# Patient Record
Sex: FEMALE | Race: WHITE | NOT HISPANIC OR LATINO | Employment: PART TIME | URBAN - METROPOLITAN AREA
[De-identification: names, ages, dates, MRNs, and addresses within clinical notes are randomized per-mention and may not be internally consistent; named-entity substitution may affect disease eponyms.]

---

## 2019-12-06 ENCOUNTER — TELEPHONE (OUTPATIENT)
Dept: HEMATOLOGY ONCOLOGY | Facility: CLINIC | Age: 49
End: 2019-12-06

## 2019-12-06 NOTE — TELEPHONE ENCOUNTER
New Patient Encounter    New Patient Intake Form   Patient Details:  Lincoln Martinez  1970  [id number]    Background Information:  [address] starts by opening a telephone encounter and gathering the following information   Who is calling to schedule? If not self, relationship to patient? self   Referring Provider Lidia Ritchie   What is the diagnosis? Complex atypical endometrial hyperplasia   When was the diagnosis? 11/2019   Is patient aware of diagnosis? Yes   Reason for visit? NP DX   Have you had any testing done? If so: when, where? Yes   Are records in EPIC? Yes, scanned to [facility]   Was the patient told to bring a disk? no   Scheduling Information:   Preferred [place]:  [facility]     Requesting Specific Provider? Lindsay Og   Are there any dates/time the patient cannot be seen? Miscellaneous: Will have [address] at [facility] on 12/9/19  Pt has [employer], cost sharing plan  She will pay at office and then be reimbursed  Should be listed as selfpay   After completing the above information, please route to Financial Counselor and the appropriate Nurse Navigator for review

## 2019-12-09 ENCOUNTER — TRANSCRIBE ORDERS (OUTPATIENT)
Dept: ADMINISTRATIVE | Facility: HOSPITAL | Age: 49
End: 2019-12-09

## 2019-12-10 ENCOUNTER — TRANSCRIBE ORDERS (OUTPATIENT)
Dept: ADMINISTRATIVE | Facility: HOSPITAL | Age: 49
End: 2019-12-10

## 2019-12-10 DIAGNOSIS — Z12.31 SCREENING MAMMOGRAM FOR HIGH-RISK PATIENT: Primary | ICD-10-CM

## 2019-12-20 ENCOUNTER — TRANSCRIBE ORDERS (OUTPATIENT)
Dept: RADIOLOGY | Facility: HOSPITAL | Age: 49
End: 2019-12-20

## 2019-12-20 ENCOUNTER — HOSPITAL ENCOUNTER (OUTPATIENT)
Dept: RADIOLOGY | Facility: HOSPITAL | Age: 49
Discharge: HOME | End: 2019-12-20
Attending: OBSTETRICS & GYNECOLOGY
Payer: COMMERCIAL

## 2019-12-20 ENCOUNTER — OFFICE VISIT (OUTPATIENT)
Dept: LAB | Facility: HOSPITAL | Age: 49
End: 2019-12-20
Attending: OBSTETRICS & GYNECOLOGY
Payer: COMMERCIAL

## 2019-12-20 ENCOUNTER — CONSULT (OUTPATIENT)
Dept: GYNECOLOGIC ONCOLOGY | Facility: CLINIC | Age: 49
End: 2019-12-20

## 2019-12-20 VITALS
HEIGHT: 63 IN | DIASTOLIC BLOOD PRESSURE: 80 MMHG | BODY MASS INDEX: 18.8 KG/M2 | TEMPERATURE: 99.5 F | HEART RATE: 74 BPM | WEIGHT: 106.1 LBS | SYSTOLIC BLOOD PRESSURE: 122 MMHG

## 2019-12-20 DIAGNOSIS — N85.02 COMPLEX ATYPICAL ENDOMETRIAL HYPERPLASIA: ICD-10-CM

## 2019-12-20 DIAGNOSIS — N85.02 COMPLEX ATYPICAL ENDOMETRIAL HYPERPLASIA: Primary | ICD-10-CM

## 2019-12-20 LAB
ALBUMIN SERPL BCP-MCNC: 3.7 G/DL (ref 3.5–5)
ALP SERPL-CCNC: 34 U/L (ref 46–116)
ALT SERPL W P-5'-P-CCNC: 21 U/L (ref 12–78)
ANION GAP SERPL CALCULATED.3IONS-SCNC: 4 MMOL/L (ref 4–13)
APTT PPP: 31 SECONDS (ref 23–37)
AST SERPL W P-5'-P-CCNC: 11 U/L (ref 5–45)
BASOPHILS # BLD AUTO: 0.04 THOUSANDS/ΜL (ref 0–0.1)
BASOPHILS NFR BLD AUTO: 0 % (ref 0–1)
BILIRUB SERPL-MCNC: 0.95 MG/DL (ref 0.2–1)
BUN SERPL-MCNC: 12 MG/DL (ref 5–25)
CALCIUM SERPL-MCNC: 9.2 MG/DL (ref 8.3–10.1)
CHLORIDE SERPL-SCNC: 106 MMOL/L (ref 100–108)
CO2 SERPL-SCNC: 29 MMOL/L (ref 21–32)
CREAT SERPL-MCNC: 0.72 MG/DL (ref 0.6–1.3)
EOSINOPHIL # BLD AUTO: 0.06 THOUSAND/ΜL (ref 0–0.61)
EOSINOPHIL NFR BLD AUTO: 1 % (ref 0–6)
ERYTHROCYTE [DISTWIDTH] IN BLOOD BY AUTOMATED COUNT: 12.1 % (ref 11.6–15.1)
EST. AVERAGE GLUCOSE BLD GHB EST-MCNC: 94 MG/DL
GFR SERPL CREATININE-BSD FRML MDRD: 98 ML/MIN/1.73SQ M
GLUCOSE P FAST SERPL-MCNC: 181 MG/DL (ref 65–99)
HBA1C MFR BLD: 4.9 % (ref 4.2–6.3)
HCT VFR BLD AUTO: 40.3 % (ref 34.8–46.1)
HGB BLD-MCNC: 12.7 G/DL (ref 11.5–15.4)
IMM GRANULOCYTES # BLD AUTO: 0.03 THOUSAND/UL (ref 0–0.2)
IMM GRANULOCYTES NFR BLD AUTO: 0 % (ref 0–2)
INR PPP: 1.12 (ref 0.84–1.19)
LYMPHOCYTES # BLD AUTO: 1.11 THOUSANDS/ΜL (ref 0.6–4.47)
LYMPHOCYTES NFR BLD AUTO: 12 % (ref 14–44)
MCH RBC QN AUTO: 28.8 PG (ref 26.8–34.3)
MCHC RBC AUTO-ENTMCNC: 31.5 G/DL (ref 31.4–37.4)
MCV RBC AUTO: 91 FL (ref 82–98)
MONOCYTES # BLD AUTO: 0.5 THOUSAND/ΜL (ref 0.17–1.22)
MONOCYTES NFR BLD AUTO: 6 % (ref 4–12)
NEUTROPHILS # BLD AUTO: 7.32 THOUSANDS/ΜL (ref 1.85–7.62)
NEUTS SEG NFR BLD AUTO: 81 % (ref 43–75)
NRBC BLD AUTO-RTO: 0 /100 WBCS
PLATELET # BLD AUTO: 267 THOUSANDS/UL (ref 149–390)
PMV BLD AUTO: 9.6 FL (ref 8.9–12.7)
POTASSIUM SERPL-SCNC: 4.1 MMOL/L (ref 3.5–5.3)
PROT SERPL-MCNC: 7.2 G/DL (ref 6.4–8.2)
PROTHROMBIN TIME: 14 SECONDS (ref 11.6–14.5)
RBC # BLD AUTO: 4.41 MILLION/UL (ref 3.81–5.12)
SODIUM SERPL-SCNC: 139 MMOL/L (ref 136–145)
WBC # BLD AUTO: 9.06 THOUSAND/UL (ref 4.31–10.16)

## 2019-12-20 PROCEDURE — 99245 OFF/OP CONSLTJ NEW/EST HI 55: CPT | Performed by: OBSTETRICS & GYNECOLOGY

## 2019-12-20 PROCEDURE — 83036 HEMOGLOBIN GLYCOSYLATED A1C: CPT

## 2019-12-20 PROCEDURE — 71046 X-RAY EXAM CHEST 2 VIEWS: CPT

## 2019-12-20 PROCEDURE — 86850 RBC ANTIBODY SCREEN: CPT

## 2019-12-20 PROCEDURE — 85610 PROTHROMBIN TIME: CPT

## 2019-12-20 PROCEDURE — 86900 BLOOD TYPING SEROLOGIC ABO: CPT

## 2019-12-20 PROCEDURE — 85730 THROMBOPLASTIN TIME PARTIAL: CPT

## 2019-12-20 PROCEDURE — 36415 COLL VENOUS BLD VENIPUNCTURE: CPT

## 2019-12-20 PROCEDURE — 93005 ELECTROCARDIOGRAM TRACING: CPT

## 2019-12-20 PROCEDURE — 85025 COMPLETE CBC W/AUTO DIFF WBC: CPT

## 2019-12-20 PROCEDURE — 80053 COMPREHEN METABOLIC PANEL: CPT

## 2019-12-20 PROCEDURE — 86901 BLOOD TYPING SEROLOGIC RH(D): CPT

## 2019-12-20 RX ORDER — CLINDAMYCIN PHOSPHATE 900 MG/50ML
900 INJECTION INTRAVENOUS ONCE
Status: CANCELLED | OUTPATIENT
Start: 2019-12-27 | End: 2019-12-20

## 2019-12-20 RX ORDER — GENTAMICIN SULFATE 80 MG/50ML
1.5 INJECTION, SOLUTION INTRAVENOUS ONCE
Status: CANCELLED | OUTPATIENT
Start: 2019-12-27 | End: 2019-12-20

## 2019-12-20 RX ORDER — ACETAMINOPHEN 500 MG
500 TABLET ORAL EVERY 6 HOURS PRN
COMMUNITY

## 2019-12-20 RX ORDER — ACETAMINOPHEN 325 MG/1
975 TABLET ORAL ONCE
Status: CANCELLED | OUTPATIENT
Start: 2019-12-27 | End: 2019-12-20

## 2019-12-20 RX ORDER — HEPARIN SODIUM 5000 [USP'U]/ML
5000 INJECTION, SOLUTION INTRAVENOUS; SUBCUTANEOUS
Status: CANCELLED | OUTPATIENT
Start: 2019-12-27 | End: 2019-12-28

## 2019-12-20 NOTE — ASSESSMENT & PLAN NOTE
50-year-old with biopsy-proven complex atypical hyperplasia, 15 week size fibroid uterus, BMI 19  Her performance status is 0   1  Continue Aygestin until surgery  2  I discussed the risks and benefits of possible robotic assisted total laparoscopic hysterectomy, bilateral salpingo oophorectomy, possible conversion to exploratory laparotomy and all other indicated procedures including possible lymph node dissection and staging  She understands the risks and benefits of the operation agrees to proceed as outlined  Consent was obtained by me in the office  3  She understands that complex atypical hyperplasia is associated with invasive endometrial cancer and 40% of cases  She understands that typically the endometrial cancer is low-grade and early stage but that there is an approximate 5% risk that she would require additional treatment such as radiation therapy postoperatively  Thank you for the courtesy of this consultation  All questions were answered by the end of the visit

## 2019-12-20 NOTE — LETTER
December 20, 2019     Dana Hair, [address] [facility] [address]  [address]  [facility]  [facility] [address]    Patient: Goldie Stevens   YOB: 1970   Date of Visit: 12/20/2019       Dear Tona Chávez:    Thank you for referring Goldie Stevens to me for evaluation  Below are the relevant portions of my H&P  If you have questions, please do not hesitate to call me  I look forward to following Blaise Horne along with you  Sincerely,        Kelli Jackson MD        CC: No Recipients  Kelli Jackson MD  12/20/2019 12:16 PM  Signed  Assessment/Plan:    Problem List Items Addressed This Visit        Genitourinary    Complex atypical endometrial hyperplasia - Primary     50-year-old with biopsy-proven complex atypical hyperplasia, 15 week size fibroid uterus, BMI 19  Her performance status is 0   1  Continue Aygestin until surgery  2  I discussed the risks and benefits of possible robotic assisted total laparoscopic hysterectomy, bilateral salpingo oophorectomy, possible conversion to exploratory laparotomy and all other indicated procedures including possible lymph node dissection and staging  She understands the risks and benefits of the operation agrees to proceed as outlined  Consent was obtained by me in the office  3  She understands that complex atypical hyperplasia is associated with invasive endometrial cancer and 40% of cases  She understands that typically the endometrial cancer is low-grade and early stage but that there is an approximate 5% risk that she would require additional treatment such as radiation therapy postoperatively  Thank you for the courtesy of this consultation  All questions were answered by the end of the visit             Relevant Orders    Case request operating room: HYSTERECTOMY LAPAROSCOPIC TOTAL ([address] [place] [address]) W/ ROBOTICS (Completed)    Comprehensive metabolic panel    CBC and differential    APTT    Protime-INR    HEMOGLOBIN A1C W/ EAG ESTIMATION    Type and screen    EKG 12 lead XR chest pa & lateral              CHIEF COMPLAINT:  Biopsy-proven complex atypical hyperplasia of the endometrium, abnormal uterine bleeding          Patient ID: Yonatan Gates is a 47 y o  female  24-year-old with abnormal uterine bleeding starting in September of 2019 who had a pelvic ultrasound revealed multiple fibroids  She had an endometrial biopsy performed on 11/28/2019 that revealed complex atypical hyperplasia of the endometrium  She was referred as a consultation by LALITO Domingo to discuss treatment options  Her bleeding has been managed with Aygestin  She takes 5 mg daily and no longer has heavy bleeding  Her most recent Pap smear with HPV was negative in November of 2019  Endometrial cells were present on the Pap smear  Review of Systems   Constitutional: Negative for activity change and unexpected weight change  HENT: Negative  Eyes: Negative  Respiratory: Negative  Cardiovascular: Negative  Gastrointestinal: Negative for abdominal distention and abdominal pain  Endocrine: Negative  Genitourinary: Positive for menstrual problem  Negative for pelvic pain  Musculoskeletal: Negative  Skin: Negative  Allergic/Immunologic: Negative  Neurological: Negative  Hematological: Negative  Psychiatric/Behavioral: Negative  Current Outpatient Medications   Medication Sig Dispense Refill    acetaminophen (TYLENOL) 500 mg tablet Take 500 mg by mouth every 6 (six) hours as needed for mild pain      Multiple Vitamins-Calcium (ONE-A-DAY WOMENS FORMULA PO) Take by mouth      norethindrone (AYGESTIN) 5 mg tablet Take 5 mg by mouth daily      Probiotic Product (PROBIOTIC + TURMERIC EXTRACT) 400 MG CAPS Take 580 mg by mouth       No current facility-administered medications for this visit          Allergies   Allergen Reactions    Penicillin V Hives    Ibuprofen Rash       Past Medical History:   Diagnosis Date    [place] teeth extracted 08/1998       History reviewed  No pertinent surgical history  OB History        0    Para   0    Term   0       0    AB   0    Living   0       SAB   0    TAB   0    Ectopic   0    Multiple   0    Live Births   0                 Family History   Problem Relation Age of Onset    Colon polyps Father     Colon cancer Maternal Aunt     Pancreatic cancer Paternal Uncle     Cancer Paternal Aunt        The following portions of the patient's history were reviewed and updated as appropriate: allergies, current medications, past family history, past medical history, past social history, past surgical history and problem list       Objective:    Blood pressure 122/80, pulse 74, temperature 99 5 °F (37 5 °C), temperature source Oral, height 5' 3" (1 6 m), weight 48 1 kg (106 lb 1 6 oz), last menstrual period 2019  Body mass index is 18 79 kg/m²  Physical Exam   Constitutional: She is oriented to person, place, and time  She appears well-developed and well-nourished  No distress  HENT:   Head: Normocephalic and atraumatic  Neck: Normal range of motion  Neck supple  No thyromegaly present  Cardiovascular: Normal rate, regular rhythm and normal heart sounds  Pulmonary/Chest: Effort normal and breath sounds normal    Abdominal: Soft  She exhibits no distension and no mass  There is no tenderness  There is no rebound and no guarding  Genitourinary:   Genitourinary Comments: The external female genitalia is normal  The bartholin's, uretheral and skenes glands are normal  The urethral meatus is normal (midline with no lesions)  Anus without fissure or lesion  Speculum exam reveals vagina without lesion or discharge  Cervix is normal appearing without visible lesions  No bleeding  No significant cystocele or rectocele noted  Bimanual exam notes enlarged uterus filling the pelvis from sidewall to sidewall  It measures approximately 15 weeks in size  No palpable adnexal masses    Bladder is without fullness, mass or tenderness  Musculoskeletal: She exhibits no edema  Lymphadenopathy:     She has no cervical adenopathy  Neurological: She is alert and oriented to person, place, and time  Skin: Skin is warm and dry  She is not diaphoretic  Psychiatric: She has a normal mood and affect   Her behavior is normal  Judgment and thought content normal

## 2019-12-20 NOTE — H&P
Assessment/Plan:    Problem List Items Addressed This Visit        Genitourinary    Complex atypical endometrial hyperplasia - Primary     48-year-old with biopsy-proven complex atypical hyperplasia, 15 week size fibroid uterus, BMI 19  Her performance status is 0   1  Continue Aygestin until surgery  2  I discussed the risks and benefits of possible robotic assisted total laparoscopic hysterectomy, bilateral salpingo oophorectomy, possible conversion to exploratory laparotomy and all other indicated procedures including possible lymph node dissection and staging  She understands the risks and benefits of the operation agrees to proceed as outlined  Consent was obtained by me in the office  3  She understands that complex atypical hyperplasia is associated with invasive endometrial cancer and 40% of cases  She understands that typically the endometrial cancer is low-grade and early stage but that there is an approximate 5% risk that she would require additional treatment such as radiation therapy postoperatively  Thank you for the courtesy of this consultation  All questions were answered by the end of the visit  Relevant Orders    Case request operating room: HYSTERECTOMY LAPAROSCOPIC TOTAL ([address] [facility]) W/ ROBOTICS (Completed)    Comprehensive metabolic panel    CBC and differential    APTT    Protime-INR    HEMOGLOBIN A1C W/ EAG ESTIMATION    Type and screen    EKG 12 lead    XR chest pa & lateral              CHIEF COMPLAINT:  Biopsy-proven complex atypical hyperplasia of the endometrium, abnormal uterine bleeding          Patient ID: Brian Lake is a 47 y o  female  48-year-old with abnormal uterine bleeding starting in September of 2019 who had a pelvic ultrasound revealed multiple fibroids  She had an endometrial biopsy performed on 11/28/2019 that revealed complex atypical hyperplasia of the endometrium  She was referred as a consultation by LALITO Beaulieu to discuss treatment options    Her bleeding has been managed with Aygestin  She takes 5 mg daily and no longer has heavy bleeding  Her most recent Pap smear with HPV was negative in 2019  Endometrial cells were present on the Pap smear  Review of Systems   Constitutional: Negative for activity change and unexpected weight change  HENT: Negative  Eyes: Negative  Respiratory: Negative  Cardiovascular: Negative  Gastrointestinal: Negative for abdominal distention and abdominal pain  Endocrine: Negative  Genitourinary: Positive for menstrual problem  Negative for pelvic pain  Musculoskeletal: Negative  Skin: Negative  Allergic/Immunologic: Negative  Neurological: Negative  Hematological: Negative  Psychiatric/Behavioral: Negative  Current Outpatient Medications   Medication Sig Dispense Refill    acetaminophen (TYLENOL) 500 mg tablet Take 500 mg by mouth every 6 (six) hours as needed for mild pain      Multiple Vitamins-Calcium (ONE-A-DAY WOMENS FORMULA PO) Take by mouth      norethindrone (AYGESTIN) 5 mg tablet Take 5 mg by mouth daily      Probiotic Product (PROBIOTIC + TURMERIC EXTRACT) 400 MG CAPS Take 580 mg by mouth       No current facility-administered medications for this visit  Allergies   Allergen Reactions    Penicillin V Hives    Ibuprofen Rash       Past Medical History:   Diagnosis Date    [place] teeth extracted 1998       History reviewed  No pertinent surgical history      OB History        0    Para   0    Term   0       0    AB   0    Living   0       SAB   0    TAB   0    Ectopic   0    Multiple   0    Live Births   0                 Family History   Problem Relation Age of Onset    Colon polyps Father     Colon cancer Maternal Aunt     Pancreatic cancer Paternal Uncle     Cancer Paternal Aunt        The following portions of the patient's history were reviewed and updated as appropriate: allergies, current medications, past family history, past medical history, past social history, past surgical history and problem list       Objective:    Blood pressure 122/80, pulse 74, temperature 99 5 °F (37 5 °C), temperature source Oral, height 5' 3" (1 6 m), weight 48 1 kg (106 lb 1 6 oz), last menstrual period 11/13/2019  Body mass index is 18 79 kg/m²  Physical Exam   Constitutional: She is oriented to person, place, and time  She appears well-developed and well-nourished  No distress  HENT:   Head: Normocephalic and atraumatic  Neck: Normal range of motion  Neck supple  No thyromegaly present  Cardiovascular: Normal rate, regular rhythm and normal heart sounds  Pulmonary/Chest: Effort normal and breath sounds normal    Abdominal: Soft  She exhibits no distension and no mass  There is no tenderness  There is no rebound and no guarding  Genitourinary:   Genitourinary Comments: The external female genitalia is normal  The bartholin's, uretheral and skenes glands are normal  The urethral meatus is normal (midline with no lesions)  Anus without fissure or lesion  Speculum exam reveals vagina without lesion or discharge  Cervix is normal appearing without visible lesions  No bleeding  No significant cystocele or rectocele noted  Bimanual exam notes enlarged uterus filling the pelvis from sidewall to sidewall  It measures approximately 15 weeks in size  No palpable adnexal masses    Bladder is without fullness, mass or tenderness  Musculoskeletal: She exhibits no edema  Lymphadenopathy:     She has no cervical adenopathy  Neurological: She is alert and oriented to person, place, and time  Skin: Skin is warm and dry  She is not diaphoretic  Psychiatric: She has a normal mood and affect   Her behavior is normal  Judgment and thought content normal

## 2019-12-20 NOTE — H&P (VIEW-ONLY)
Assessment/Plan:    Problem List Items Addressed This Visit        Genitourinary    Complex atypical endometrial hyperplasia - Primary     50-year-old with biopsy-proven complex atypical hyperplasia, 15 week size fibroid uterus, BMI 19  Her performance status is 0   1  Continue Aygestin until surgery  2  I discussed the risks and benefits of possible robotic assisted total laparoscopic hysterectomy, bilateral salpingo oophorectomy, possible conversion to exploratory laparotomy and all other indicated procedures including possible lymph node dissection and staging  She understands the risks and benefits of the operation agrees to proceed as outlined  Consent was obtained by me in the office  3  She understands that complex atypical hyperplasia is associated with invasive endometrial cancer and 40% of cases  She understands that typically the endometrial cancer is low-grade and early stage but that there is an approximate 5% risk that she would require additional treatment such as radiation therapy postoperatively  Thank you for the courtesy of this consultation  All questions were answered by the end of the visit  Relevant Orders    Case request operating room: HYSTERECTOMY LAPAROSCOPIC TOTAL ([address]) W/ ROBOTICS (Completed)    Comprehensive metabolic panel    CBC and differential    APTT    Protime-INR    HEMOGLOBIN A1C W/ EAG ESTIMATION    Type and screen    EKG 12 lead    XR chest pa & lateral              CHIEF COMPLAINT:  Biopsy-proven complex atypical hyperplasia of the endometrium, abnormal uterine bleeding          Patient ID: Fátima Fowler is a 47 y o  female  50-year-old with abnormal uterine bleeding starting in September of 2019 who had a pelvic ultrasound revealed multiple fibroids  She had an endometrial biopsy performed on 11/28/2019 that revealed complex atypical hyperplasia of the endometrium  She was referred as a consultation by LALITO White to discuss treatment options    Her bleeding has been managed with Aygestin  She takes 5 mg daily and no longer has heavy bleeding  Her most recent Pap smear with HPV was negative in 2019  Endometrial cells were present on the Pap smear  Review of Systems   Constitutional: Negative for activity change and unexpected weight change  HENT: Negative  Eyes: Negative  Respiratory: Negative  Cardiovascular: Negative  Gastrointestinal: Negative for abdominal distention and abdominal pain  Endocrine: Negative  Genitourinary: Positive for menstrual problem  Negative for pelvic pain  Musculoskeletal: Negative  Skin: Negative  Allergic/Immunologic: Negative  Neurological: Negative  Hematological: Negative  Psychiatric/Behavioral: Negative  Current Outpatient Medications   Medication Sig Dispense Refill    acetaminophen (TYLENOL) 500 mg tablet Take 500 mg by mouth every 6 (six) hours as needed for mild pain      Multiple Vitamins-Calcium (ONE-A-DAY WOMENS FORMULA PO) Take by mouth      norethindrone (AYGESTIN) 5 mg tablet Take 5 mg by mouth daily      Probiotic Product (PROBIOTIC + TURMERIC EXTRACT) 400 MG CAPS Take 580 mg by mouth       No current facility-administered medications for this visit  Allergies   Allergen Reactions    Penicillin V Hives    Ibuprofen Rash       Past Medical History:   Diagnosis Date    [place] teeth extracted 1998       History reviewed  No pertinent surgical history      OB History        0    Para   0    Term   0       0    AB   0    Living   0       SAB   0    TAB   0    Ectopic   0    Multiple   0    Live Births   0                 Family History   Problem Relation Age of Onset    Colon polyps Father     Colon cancer Maternal Aunt     Pancreatic cancer Paternal Uncle     Cancer Paternal Aunt        The following portions of the patient's history were reviewed and updated as appropriate: allergies, current medications, past family history, past medical history, past social history, past surgical history and problem list       Objective:    Blood pressure 122/80, pulse 74, temperature 99 5 °F (37 5 °C), temperature source Oral, height 5' 3" (1 6 m), weight 48 1 kg (106 lb 1 6 oz), last menstrual period 11/13/2019  Body mass index is 18 79 kg/m²  Physical Exam   Constitutional: She is oriented to person, place, and time  She appears well-developed and well-nourished  No distress  HENT:   Head: Normocephalic and atraumatic  Neck: Normal range of motion  Neck supple  No thyromegaly present  Cardiovascular: Normal rate, regular rhythm and normal heart sounds  Pulmonary/Chest: Effort normal and breath sounds normal    Abdominal: Soft  She exhibits no distension and no mass  There is no tenderness  There is no rebound and no guarding  Genitourinary:   Genitourinary Comments: The external female genitalia is normal  The bartholin's, uretheral and skenes glands are normal  The urethral meatus is normal (midline with no lesions)  Anus without fissure or lesion  Speculum exam reveals vagina without lesion or discharge  Cervix is normal appearing without visible lesions  No bleeding  No significant cystocele or rectocele noted  Bimanual exam notes enlarged uterus filling the pelvis from sidewall to sidewall  It measures approximately 15 weeks in size  No palpable adnexal masses    Bladder is without fullness, mass or tenderness  Musculoskeletal: She exhibits no edema  Lymphadenopathy:     She has no cervical adenopathy  Neurological: She is alert and oriented to person, place, and time  Skin: Skin is warm and dry  She is not diaphoretic  Psychiatric: She has a normal mood and affect   Her behavior is normal  Judgment and thought content normal

## 2019-12-20 NOTE — LETTER
December 20, 2019     Spenser Daley   [facility] [address] [place] [address]    Patient: Bharat Kwong   YOB: 1970   Date of Visit: 12/20/2019       Dear Dr Daniel Sandoval: Thank you for referring Bharat Kwong to me for evaluation  Below are the relevant portions of my H&P  If you have questions, please do not hesitate to call me  I look forward to following Osmany Eth along with you  Sincerely,        Anila Johnson MD        CC: No Recipients  Anila Johnson MD  12/20/2019 12:16 PM  Signed  Assessment/Plan:    Problem List Items Addressed This Visit        Genitourinary    Complex atypical endometrial hyperplasia - Primary     54-year-old with biopsy-proven complex atypical hyperplasia, 15 week size fibroid uterus, BMI 19  Her performance status is 0   1  Continue Aygestin until surgery  2  I discussed the risks and benefits of possible robotic assisted total laparoscopic hysterectomy, bilateral salpingo oophorectomy, possible conversion to exploratory laparotomy and all other indicated procedures including possible lymph node dissection and staging  She understands the risks and benefits of the operation agrees to proceed as outlined  Consent was obtained by me in the office  3  She understands that complex atypical hyperplasia is associated with invasive endometrial cancer and 40% of cases  She understands that typically the endometrial cancer is low-grade and early stage but that there is an approximate 5% risk that she would require additional treatment such as radiation therapy postoperatively  Thank you for the courtesy of this consultation  All questions were answered by the end of the visit             Relevant Orders    Case request operating room: HYSTERECTOMY LAPAROSCOPIC TOTAL ([address]) W/ ROBOTICS (Completed)    Comprehensive metabolic panel    CBC and differential    APTT    Protime-INR    HEMOGLOBIN A1C W/ EAG ESTIMATION    Type and screen    EKG 12 lead    XR chest pa & lateral              CHIEF COMPLAINT:  Biopsy-proven complex atypical hyperplasia of the endometrium, abnormal uterine bleeding          Patient ID: Ray Adkins is a 47 y o  female  29-year-old with abnormal uterine bleeding starting in September of 2019 who had a pelvic ultrasound revealed multiple fibroids  She had an endometrial biopsy performed on 11/28/2019 that revealed complex atypical hyperplasia of the endometrium  She was referred as a consultation by LALITO Cedeño to discuss treatment options  Her bleeding has been managed with Aygestin  She takes 5 mg daily and no longer has heavy bleeding  Her most recent Pap smear with HPV was negative in November of 2019  Endometrial cells were present on the Pap smear  Review of Systems   Constitutional: Negative for activity change and unexpected weight change  HENT: Negative  Eyes: Negative  Respiratory: Negative  Cardiovascular: Negative  Gastrointestinal: Negative for abdominal distention and abdominal pain  Endocrine: Negative  Genitourinary: Positive for menstrual problem  Negative for pelvic pain  Musculoskeletal: Negative  Skin: Negative  Allergic/Immunologic: Negative  Neurological: Negative  Hematological: Negative  Psychiatric/Behavioral: Negative  Current Outpatient Medications   Medication Sig Dispense Refill    acetaminophen (TYLENOL) 500 mg tablet Take 500 mg by mouth every 6 (six) hours as needed for mild pain      Multiple Vitamins-Calcium (ONE-A-DAY WOMENS FORMULA PO) Take by mouth      norethindrone (AYGESTIN) 5 mg tablet Take 5 mg by mouth daily      Probiotic Product (PROBIOTIC + TURMERIC EXTRACT) 400 MG CAPS Take 580 mg by mouth       No current facility-administered medications for this visit  Allergies   Allergen Reactions    Penicillin V Hives    Ibuprofen Rash       Past Medical History:   Diagnosis Date    [place] teeth extracted 08/1998       History reviewed   No pertinent surgical history  OB History        0    Para   0    Term   0       0    AB   0    Living   0       SAB   0    TAB   0    Ectopic   0    Multiple   0    Live Births   0                 Family History   Problem Relation Age of Onset    Colon polyps Father     Colon cancer Maternal Aunt     Pancreatic cancer Paternal Uncle     Cancer Paternal Aunt        The following portions of the patient's history were reviewed and updated as appropriate: allergies, current medications, past family history, past medical history, past social history, past surgical history and problem list       Objective:    Blood pressure 122/80, pulse 74, temperature 99 5 °F (37 5 °C), temperature source Oral, height 5' 3" (1 6 m), weight 48 1 kg (106 lb 1 6 oz), last menstrual period 2019  Body mass index is 18 79 kg/m²  Physical Exam   Constitutional: She is oriented to person, place, and time  She appears well-developed and well-nourished  No distress  HENT:   Head: Normocephalic and atraumatic  Neck: Normal range of motion  Neck supple  No thyromegaly present  Cardiovascular: Normal rate, regular rhythm and normal heart sounds  Pulmonary/Chest: Effort normal and breath sounds normal    Abdominal: Soft  She exhibits no distension and no mass  There is no tenderness  There is no rebound and no guarding  Genitourinary:   Genitourinary Comments: The external female genitalia is normal  The bartholin's, uretheral and skenes glands are normal  The urethral meatus is normal (midline with no lesions)  Anus without fissure or lesion  Speculum exam reveals vagina without lesion or discharge  Cervix is normal appearing without visible lesions  No bleeding  No significant cystocele or rectocele noted  Bimanual exam notes enlarged uterus filling the pelvis from sidewall to sidewall  It measures approximately 15 weeks in size  No palpable adnexal masses    Bladder is without fullness, mass or tenderness  Musculoskeletal: She exhibits no edema  Lymphadenopathy:     She has no cervical adenopathy  Neurological: She is alert and oriented to person, place, and time  Skin: Skin is warm and dry  She is not diaphoretic  Psychiatric: She has a normal mood and affect   Her behavior is normal  Judgment and thought content normal

## 2019-12-21 LAB
ATRIAL RATE: 77 BPM
P AXIS: 81 DEGREES
PR INTERVAL: 138 MS
QRS AXIS: -42 DEGREES
QRSD INTERVAL: 70 MS
QT INTERVAL: 348 MS
QTC INTERVAL: 393 MS
T WAVE AXIS: 37 DEGREES
VENTRICULAR RATE: 77 BPM

## 2019-12-21 PROCEDURE — 93010 ELECTROCARDIOGRAM REPORT: CPT | Performed by: INTERNAL MEDICINE

## 2019-12-23 LAB
ABO GROUP BLD: NORMAL
BLD GP AB SCN SERPL QL: NEGATIVE
RH BLD: POSITIVE
SPECIMEN EXPIRATION DATE: NORMAL

## 2019-12-23 NOTE — PRE-PROCEDURE INSTRUCTIONS
Pre-Surgery Instructions:   Medication Instructions    acetaminophen (TYLENOL) 500 mg tablet Patient was instructed by Physician and understands   Multiple Vitamins-Calcium (ONE-A-DAY WOMENS FORMULA PO) Patient was instructed by Physician and understands   norethindrone (AYGESTIN) 5 mg tablet Patient was instructed by Physician and understands   Probiotic Product (PROBIOTIC + TURMERIC EXTRACT) 400 MG CAPS Patient was instructed by Physician and understands  [facility] preop instructions reviewed with pt  Pt will get dial antibacterial soap  ERAS information reviewed with pt

## 2019-12-26 ENCOUNTER — ANESTHESIA EVENT (OUTPATIENT)
Dept: PERIOP | Facility: HOSPITAL | Age: 49
DRG: 512 | End: 2019-12-26
Payer: COMMERCIAL

## 2019-12-27 ENCOUNTER — HOSPITAL ENCOUNTER (INPATIENT)
Facility: HOSPITAL | Age: 49
LOS: 4 days | Discharge: HOME | DRG: 512 | End: 2019-12-31
Attending: OBSTETRICS & GYNECOLOGY | Admitting: OBSTETRICS & GYNECOLOGY
Payer: COMMERCIAL

## 2019-12-27 ENCOUNTER — ANESTHESIA (OUTPATIENT)
Dept: PERIOP | Facility: HOSPITAL | Age: 49
DRG: 512 | End: 2019-12-27
Payer: COMMERCIAL

## 2019-12-27 DIAGNOSIS — N85.02 COMPLEX ATYPICAL ENDOMETRIAL HYPERPLASIA: ICD-10-CM

## 2019-12-27 PROBLEM — Z90.710 STATUS POST TOTAL ABDOMINAL HYSTERECTOMY AND BILATERAL SALPINGO-OOPHORECTOMY (TAH-BSO): Status: ACTIVE | Noted: 2019-12-27

## 2019-12-27 PROBLEM — Z90.722 STATUS POST TOTAL ABDOMINAL HYSTERECTOMY AND BILATERAL SALPINGO-OOPHORECTOMY (TAH-BSO): Status: ACTIVE | Noted: 2019-12-27

## 2019-12-27 PROBLEM — Z90.79 STATUS POST TOTAL ABDOMINAL HYSTERECTOMY AND BILATERAL SALPINGO-OOPHORECTOMY (TAH-BSO): Status: ACTIVE | Noted: 2019-12-27

## 2019-12-27 LAB
ABO GROUP BLD: NORMAL
BLD GP AB SCN SERPL QL: NEGATIVE
EXT PREGNANCY TEST URINE: NEGATIVE
EXT. CONTROL: NORMAL
RH BLD: POSITIVE
SPECIMEN EXPIRATION DATE: NORMAL

## 2019-12-27 PROCEDURE — 88331 PATH CONSLTJ SURG 1 BLK 1SPC: CPT | Performed by: PATHOLOGY

## 2019-12-27 PROCEDURE — 88341 IMHCHEM/IMCYTCHM EA ADD ANTB: CPT | Performed by: PATHOLOGY

## 2019-12-27 PROCEDURE — 88309 TISSUE EXAM BY PATHOLOGIST: CPT | Performed by: PATHOLOGY

## 2019-12-27 PROCEDURE — 88342 IMHCHEM/IMCYTCHM 1ST ANTB: CPT | Performed by: PATHOLOGY

## 2019-12-27 PROCEDURE — 86850 RBC ANTIBODY SCREEN: CPT | Performed by: OBSTETRICS & GYNECOLOGY

## 2019-12-27 PROCEDURE — 86901 BLOOD TYPING SEROLOGIC RH(D): CPT | Performed by: OBSTETRICS & GYNECOLOGY

## 2019-12-27 PROCEDURE — 0UT90ZZ RESECTION OF UTERUS, OPEN APPROACH: ICD-10-PCS | Performed by: OBSTETRICS & GYNECOLOGY

## 2019-12-27 PROCEDURE — 99024 POSTOP FOLLOW-UP VISIT: CPT | Performed by: OBSTETRICS & GYNECOLOGY

## 2019-12-27 PROCEDURE — 58150 TOTAL HYSTERECTOMY: CPT | Performed by: OBSTETRICS & GYNECOLOGY

## 2019-12-27 PROCEDURE — 0UT20ZZ RESECTION OF BILATERAL OVARIES, OPEN APPROACH: ICD-10-PCS | Performed by: OBSTETRICS & GYNECOLOGY

## 2019-12-27 PROCEDURE — 0UT70ZZ RESECTION OF BILATERAL FALLOPIAN TUBES, OPEN APPROACH: ICD-10-PCS | Performed by: OBSTETRICS & GYNECOLOGY

## 2019-12-27 PROCEDURE — 81025 URINE PREGNANCY TEST: CPT | Performed by: ANESTHESIOLOGY

## 2019-12-27 PROCEDURE — 86900 BLOOD TYPING SEROLOGIC ABO: CPT | Performed by: OBSTETRICS & GYNECOLOGY

## 2019-12-27 RX ORDER — ROPIVACAINE HYDROCHLORIDE 2 MG/ML
INJECTION, SOLUTION EPIDURAL; INFILTRATION; PERINEURAL AS NEEDED
Status: DISCONTINUED | OUTPATIENT
Start: 2019-12-27 | End: 2019-12-27

## 2019-12-27 RX ORDER — PROPOFOL 10 MG/ML
INJECTION, EMULSION INTRAVENOUS AS NEEDED
Status: DISCONTINUED | OUTPATIENT
Start: 2019-12-27 | End: 2019-12-27 | Stop reason: SURG

## 2019-12-27 RX ORDER — FENTANYL CITRATE 50 UG/ML
INJECTION, SOLUTION INTRAMUSCULAR; INTRAVENOUS AS NEEDED
Status: DISCONTINUED | OUTPATIENT
Start: 2019-12-27 | End: 2019-12-27 | Stop reason: SURG

## 2019-12-27 RX ORDER — GENTAMICIN SULFATE 80 MG/50ML
1.5 INJECTION, SOLUTION INTRAVENOUS ONCE
Status: COMPLETED | OUTPATIENT
Start: 2019-12-27 | End: 2019-12-27

## 2019-12-27 RX ORDER — ROPIVACAINE HYDROCHLORIDE 2 MG/ML
INJECTION, SOLUTION EPIDURAL; INFILTRATION; PERINEURAL AS NEEDED
Status: DISCONTINUED | OUTPATIENT
Start: 2019-12-27 | End: 2019-12-27 | Stop reason: SURG

## 2019-12-27 RX ORDER — OXYCODONE HYDROCHLORIDE 5 MG/1
5 TABLET ORAL EVERY 4 HOURS PRN
Status: DISCONTINUED | OUTPATIENT
Start: 2019-12-27 | End: 2019-12-31 | Stop reason: HOSPADM

## 2019-12-27 RX ORDER — MAGNESIUM HYDROXIDE 1200 MG/15ML
LIQUID ORAL AS NEEDED
Status: DISCONTINUED | OUTPATIENT
Start: 2019-12-27 | End: 2019-12-27 | Stop reason: HOSPADM

## 2019-12-27 RX ORDER — DEXAMETHASONE SODIUM PHOSPHATE 10 MG/ML
INJECTION, SOLUTION INTRAMUSCULAR; INTRAVENOUS AS NEEDED
Status: DISCONTINUED | OUTPATIENT
Start: 2019-12-27 | End: 2019-12-27 | Stop reason: SURG

## 2019-12-27 RX ORDER — SODIUM CHLORIDE 9 MG/ML
125 INJECTION, SOLUTION INTRAVENOUS CONTINUOUS
Status: DISCONTINUED | OUTPATIENT
Start: 2019-12-27 | End: 2019-12-27 | Stop reason: HOSPADM

## 2019-12-27 RX ORDER — NEOSTIGMINE METHYLSULFATE 1 MG/ML
INJECTION INTRAVENOUS AS NEEDED
Status: DISCONTINUED | OUTPATIENT
Start: 2019-12-27 | End: 2019-12-27 | Stop reason: SURG

## 2019-12-27 RX ORDER — GLYCOPYRROLATE 0.2 MG/ML
INJECTION INTRAMUSCULAR; INTRAVENOUS AS NEEDED
Status: DISCONTINUED | OUTPATIENT
Start: 2019-12-27 | End: 2019-12-27 | Stop reason: SURG

## 2019-12-27 RX ORDER — DOCUSATE SODIUM 100 MG/1
100 CAPSULE, LIQUID FILLED ORAL 2 TIMES DAILY
Status: DISCONTINUED | OUTPATIENT
Start: 2019-12-27 | End: 2019-12-31 | Stop reason: HOSPADM

## 2019-12-27 RX ORDER — SIMETHICONE 80 MG
80 TABLET,CHEWABLE ORAL EVERY 6 HOURS PRN
Status: DISCONTINUED | OUTPATIENT
Start: 2019-12-27 | End: 2019-12-31 | Stop reason: HOSPADM

## 2019-12-27 RX ORDER — OXYCODONE HYDROCHLORIDE 10 MG/1
10 TABLET ORAL EVERY 4 HOURS PRN
Status: DISCONTINUED | OUTPATIENT
Start: 2019-12-27 | End: 2019-12-31 | Stop reason: HOSPADM

## 2019-12-27 RX ORDER — HYDROMORPHONE HCL 110MG/55ML
PATIENT CONTROLLED ANALGESIA SYRINGE INTRAVENOUS AS NEEDED
Status: DISCONTINUED | OUTPATIENT
Start: 2019-12-27 | End: 2019-12-27 | Stop reason: SURG

## 2019-12-27 RX ORDER — SODIUM CHLORIDE, SODIUM LACTATE, POTASSIUM CHLORIDE, CALCIUM CHLORIDE 600; 310; 30; 20 MG/100ML; MG/100ML; MG/100ML; MG/100ML
100 INJECTION, SOLUTION INTRAVENOUS CONTINUOUS
Status: DISCONTINUED | OUTPATIENT
Start: 2019-12-27 | End: 2019-12-31 | Stop reason: HOSPADM

## 2019-12-27 RX ORDER — VECURONIUM BROMIDE 1 MG/ML
INJECTION, POWDER, LYOPHILIZED, FOR SOLUTION INTRAVENOUS AS NEEDED
Status: DISCONTINUED | OUTPATIENT
Start: 2019-12-27 | End: 2019-12-27 | Stop reason: SURG

## 2019-12-27 RX ORDER — ACETAMINOPHEN 325 MG/1
975 TABLET ORAL ONCE
Status: COMPLETED | OUTPATIENT
Start: 2019-12-27 | End: 2019-12-27

## 2019-12-27 RX ORDER — ONDANSETRON 2 MG/ML
4 INJECTION INTRAMUSCULAR; INTRAVENOUS ONCE AS NEEDED
Status: DISCONTINUED | OUTPATIENT
Start: 2019-12-27 | End: 2019-12-27 | Stop reason: HOSPADM

## 2019-12-27 RX ORDER — ONDANSETRON 2 MG/ML
INJECTION INTRAMUSCULAR; INTRAVENOUS AS NEEDED
Status: DISCONTINUED | OUTPATIENT
Start: 2019-12-27 | End: 2019-12-27 | Stop reason: SURG

## 2019-12-27 RX ORDER — SCOLOPAMINE TRANSDERMAL SYSTEM 1 MG/1
1 PATCH, EXTENDED RELEASE TRANSDERMAL ONCE AS NEEDED
Status: DISCONTINUED | OUTPATIENT
Start: 2019-12-27 | End: 2019-12-27

## 2019-12-27 RX ORDER — HYDROMORPHONE HCL/PF 1 MG/ML
0.5 SYRINGE (ML) INJECTION
Status: DISCONTINUED | OUTPATIENT
Start: 2019-12-27 | End: 2019-12-27 | Stop reason: HOSPADM

## 2019-12-27 RX ORDER — HEPARIN SODIUM 5000 [USP'U]/ML
5000 INJECTION, SOLUTION INTRAVENOUS; SUBCUTANEOUS
Status: COMPLETED | OUTPATIENT
Start: 2019-12-27 | End: 2019-12-27

## 2019-12-27 RX ORDER — OXYCODONE HYDROCHLORIDE 5 MG/1
TABLET ORAL
Qty: 10 TABLET | Refills: 0 | Status: SHIPPED | OUTPATIENT
Start: 2019-12-27 | End: 2021-09-07 | Stop reason: ALTCHOICE

## 2019-12-27 RX ORDER — CLINDAMYCIN PHOSPHATE 900 MG/50ML
900 INJECTION INTRAVENOUS ONCE
Status: COMPLETED | OUTPATIENT
Start: 2019-12-27 | End: 2019-12-27

## 2019-12-27 RX ORDER — ACETAMINOPHEN 325 MG/1
650 TABLET ORAL EVERY 6 HOURS PRN
Status: DISCONTINUED | OUTPATIENT
Start: 2019-12-27 | End: 2019-12-31 | Stop reason: HOSPADM

## 2019-12-27 RX ORDER — ONDANSETRON 2 MG/ML
4 INJECTION INTRAMUSCULAR; INTRAVENOUS EVERY 6 HOURS PRN
Status: DISCONTINUED | OUTPATIENT
Start: 2019-12-27 | End: 2019-12-31 | Stop reason: HOSPADM

## 2019-12-27 RX ORDER — LIDOCAINE HYDROCHLORIDE 10 MG/ML
INJECTION, SOLUTION EPIDURAL; INFILTRATION; INTRACAUDAL; PERINEURAL AS NEEDED
Status: DISCONTINUED | OUTPATIENT
Start: 2019-12-27 | End: 2019-12-27 | Stop reason: SURG

## 2019-12-27 RX ORDER — KETOROLAC TROMETHAMINE 30 MG/ML
INJECTION, SOLUTION INTRAMUSCULAR; INTRAVENOUS AS NEEDED
Status: DISCONTINUED | OUTPATIENT
Start: 2019-12-27 | End: 2019-12-27 | Stop reason: SURG

## 2019-12-27 RX ORDER — FENTANYL CITRATE/PF 50 MCG/ML
50 SYRINGE (ML) INJECTION
Status: DISCONTINUED | OUTPATIENT
Start: 2019-12-27 | End: 2019-12-27 | Stop reason: HOSPADM

## 2019-12-27 RX ORDER — MIDAZOLAM HYDROCHLORIDE 2 MG/2ML
INJECTION, SOLUTION INTRAMUSCULAR; INTRAVENOUS AS NEEDED
Status: DISCONTINUED | OUTPATIENT
Start: 2019-12-27 | End: 2019-12-27 | Stop reason: SURG

## 2019-12-27 RX ADMIN — SODIUM CHLORIDE, SODIUM LACTATE, POTASSIUM CHLORIDE, AND CALCIUM CHLORIDE 100 ML/HR: .6; .31; .03; .02 INJECTION, SOLUTION INTRAVENOUS at 14:45

## 2019-12-27 RX ADMIN — ONDANSETRON 4 MG: 2 INJECTION INTRAMUSCULAR; INTRAVENOUS at 11:51

## 2019-12-27 RX ADMIN — PROPOFOL 150 MG: 10 INJECTION, EMULSION INTRAVENOUS at 10:25

## 2019-12-27 RX ADMIN — MIDAZOLAM 2 MG: 1 INJECTION INTRAMUSCULAR; INTRAVENOUS at 10:10

## 2019-12-27 RX ADMIN — FENTANYL CITRATE 100 MCG: 50 INJECTION, SOLUTION INTRAMUSCULAR; INTRAVENOUS at 10:25

## 2019-12-27 RX ADMIN — ACETAMINOPHEN 975 MG: 325 TABLET ORAL at 08:51

## 2019-12-27 RX ADMIN — SODIUM CHLORIDE: 0.9 INJECTION, SOLUTION INTRAVENOUS at 10:52

## 2019-12-27 RX ADMIN — HYDROMORPHONE HYDROCHLORIDE 1 MG: 2 INJECTION, SOLUTION INTRAMUSCULAR; INTRAVENOUS; SUBCUTANEOUS at 11:52

## 2019-12-27 RX ADMIN — SCOPALAMINE 1 PATCH: 1 PATCH, EXTENDED RELEASE TRANSDERMAL at 08:46

## 2019-12-27 RX ADMIN — SODIUM CHLORIDE 125 ML/HR: 0.9 INJECTION, SOLUTION INTRAVENOUS at 09:03

## 2019-12-27 RX ADMIN — KETOROLAC TROMETHAMINE 30 MG: 30 INJECTION, SOLUTION INTRAMUSCULAR at 11:51

## 2019-12-27 RX ADMIN — FENTANYL CITRATE 50 MCG: 50 INJECTION, SOLUTION INTRAMUSCULAR; INTRAVENOUS at 11:03

## 2019-12-27 RX ADMIN — ROPIVACAINE HYDROCHLORIDE 25 ML: 2 INJECTION, SOLUTION EPIDURAL; INFILTRATION at 12:32

## 2019-12-27 RX ADMIN — NEOSTIGMINE METHYLSULFATE 3 MG: 1 INJECTION, SOLUTION INTRAVENOUS at 11:51

## 2019-12-27 RX ADMIN — GENTAMICIN SULFATE 80 MG: 80 INJECTION, SOLUTION INTRAVENOUS at 10:07

## 2019-12-27 RX ADMIN — ONDANSETRON 4 MG: 2 INJECTION INTRAMUSCULAR; INTRAVENOUS at 10:51

## 2019-12-27 RX ADMIN — SODIUM CHLORIDE: 0.9 INJECTION, SOLUTION INTRAVENOUS at 12:12

## 2019-12-27 RX ADMIN — FENTANYL CITRATE 50 MCG: 50 INJECTION, SOLUTION INTRAMUSCULAR; INTRAVENOUS at 11:51

## 2019-12-27 RX ADMIN — GLYCOPYRROLATE 0.4 MG: 0.2 INJECTION INTRAMUSCULAR; INTRAVENOUS at 11:51

## 2019-12-27 RX ADMIN — HEPARIN SODIUM 5000 UNITS: 5000 INJECTION INTRAVENOUS; SUBCUTANEOUS at 09:30

## 2019-12-27 RX ADMIN — ROPIVACAINE HYDROCHLORIDE 25 ML: 2 INJECTION, SOLUTION EPIDURAL; INFILTRATION at 12:28

## 2019-12-27 RX ADMIN — CLINDAMYCIN PHOSPHATE 900 MG: 18 INJECTION, SOLUTION INTRAMUSCULAR; INTRAVENOUS at 10:07

## 2019-12-27 RX ADMIN — OXYCODONE HYDROCHLORIDE 5 MG: 5 TABLET ORAL at 20:47

## 2019-12-27 RX ADMIN — VECURONIUM BROMIDE 7 MG: 1 INJECTION, POWDER, LYOPHILIZED, FOR SOLUTION INTRAVENOUS at 10:25

## 2019-12-27 RX ADMIN — DEXAMETHASONE SODIUM PHOSPHATE 8 MG: 10 INJECTION, SOLUTION INTRAMUSCULAR; INTRAVENOUS at 10:51

## 2019-12-27 RX ADMIN — HYDROMORPHONE HYDROCHLORIDE 1 MG: 2 INJECTION, SOLUTION INTRAMUSCULAR; INTRAVENOUS; SUBCUTANEOUS at 11:03

## 2019-12-27 RX ADMIN — LIDOCAINE HYDROCHLORIDE 80 MG: 10 INJECTION, SOLUTION EPIDURAL; INFILTRATION; INTRACAUDAL; PERINEURAL at 10:25

## 2019-12-27 RX ADMIN — DOCUSATE SODIUM 100 MG: 100 CAPSULE, LIQUID FILLED ORAL at 18:16

## 2019-12-27 NOTE — OP NOTE
OPERATIVE REPORT  PATIENT NAME: Maria Alejandra Panda    :  1970  MRN: [id number]  Pt Location: [facility] OR ROOM 06    SURGERY DATE: 2019    Surgeon(s) and Role:     * Josselyn Jones MD - Primary    Preop Diagnosis:  Complex atypical endometrial hyperplasia [N85 02]    Post-Op Diagnosis Codes:     * Complex atypical endometrial hyperplasia [N85 02]    Procedure(s) (LRB):  ORLANDO/ BSO, (N/A)  LAPAROTOMY EXPLORATORY (N/A)    Specimen(s):  ID Type Source Tests Collected by Time Destination   1 : uterus cervix b/l fallo;otilia tubes and ovaries Tissue Uterus w/Bilateral Ovaries and Fallopian Tubes TISSUE EXAM Josselyn Jones MD 2019 [id number]        Estimated Blood Loss:   50 mL    Drains:  Urethral Catheter Non-latex 16 Fr  (Active)   Number of days: 0       Anesthesia Type:   General    Operative Indications:  Complex atypical endometrial hyperplasia [N85 02]      Operative Findings:  Fibroid uterus extending up to the umbilicus  Normal bilateral tubes and ovaries  No evidence of intra-abdominal disease  Frozen section on uterus indicated complex endometrial hyperplasia with atypia no obvious invasive disease  Complications:   None    Procedure and Technique:  After informed consent was obtained, the patient was taken to the operating room where general endotracheal anesthesia was then administered without incident  An exam under anesthesia was performed revealing a uterus extending up to the umbilicus  This was also wide and 1 sidewall to sidewall  This filled the lower abdomen and pelvis of this small structured patient  It was therefore elected to proceed with open hysterectomy  A full time out procedure was performed  The patient was prepped and draped in normal sterile fashion in the supine position  The patient was froglegged and a chlorhexidine vaginal prep was performed then a Mancuso catheter was inserted   A midline vertical incision was created with a knife and carried through to the fascia with a Bovie electrocautery device  This was taken down to the underlying layer of fascia with cautery  The fascia was opened the midline  The fascial incision extended superiorly and anteriorly  The peritoneum was identified and entered  The peritoneal incision extended superiorly and inferiorly as well  The retro-peritoneal space on the right side was opened using cautery  The round ligament was transected  The ureter was identified coursing normally within the medial leaf of the broad ligament  The infundibulopelvic ligament on the right side was skeletonized, and taken down with a large jaw Enseal  Hemostasis was assured  This procedure was then repeated on the left side  The retro-peritoneal space on the left side was opened using cautery  The round ligament was suture ligated and  transected  The ureter was identified coursing normally within the medial leaf of the broad ligament  The infundibulum pelvic ligament on the left side was skeletonized, and taken down with a large jaw Enseal  Hemostasis was assured  The vesicovaginal space was then opened using cautery  The bladder was taken down below the level of the external os of the cervix  The uterine vessels were skeletonized bilaterally  They were taken down with the large jaw Enseal  The cardinal ligaments were taken down in a  similar fashion clamped  until the level of the external os of the cervix was reached  Right angle Zeppelin clamps were used to come across the upper portion of the vagina and uterosacral ligaments  The specimen was removed after cutting with Dejuan [place] scissors  The vaginal apex was then secured using interrupted figure-of-eight 0 Vicryl suture with excellent hemostasis noted  The pelvis was irrigated  There is no evidence of bleeding noted  The operating staff was regloved and gowned  The wound was redraped and a separate closure tray was brought into the field   The fascia was closed using #1 looped PDS in a running Pete fashion  The skin was closed with stratafix and histocryl  A sterile dressing was applied  The patient was then awakened and transferred to the recovery room in stable condition  All instrument and instrument counts were correct X 2 for the procedure  No complications were noted  I was present for the entire procedure     I was present for the entire procedure    Patient Disposition:  PACU     SIGNATURE: Herminio Sanz MD  DATE: December 27, 2019  TIME: 12:07 PM

## 2019-12-27 NOTE — PLAN OF CARE
Problem: Prexisting or High Potential for Compromised Skin Integrity  Goal: Skin integrity is maintained or improved  Description  INTERVENTIONS:  - Identify patients at risk for skin breakdown  - Assess and monitor skin integrity  - Assess and monitor nutrition and hydration status  - Monitor labs   - Assess for incontinence   - Turn and reposition patient  - Assist with mobility/ambulation  - Relieve pressure over bony prominences  - Avoid friction and shearing  - Provide appropriate hygiene as needed including keeping skin clean and dry  - Evaluate need for skin moisturizer/barrier cream  - Collaborate with interdisciplinary team   - Patient/family teaching  - Consider wound care consult   Outcome: Progressing     Problem: PAIN - ADULT  Goal: Verbalizes/displays adequate comfort level or baseline comfort level  Description  Interventions:  - Encourage patient to monitor pain and request assistance  - Assess pain using appropriate pain scale  - Administer analgesics based on type and severity of pain and evaluate response  - Implement non-pharmacological measures as appropriate and evaluate response  - Consider cultural and social influences on pain and pain management  - Notify physician/advanced practitioner if interventions unsuccessful or patient reports new pain  Outcome: Progressing     Problem: INFECTION - ADULT  Goal: Absence or prevention of progression during hospitalization  Description  INTERVENTIONS:  - Assess and monitor for signs and symptoms of infection  - Monitor lab/diagnostic results  - Monitor all insertion sites, i e  indwelling lines, tubes, and drains  - Monitor endotracheal if appropriate and nasal secretions for changes in amount and color  - [place] appropriate cooling/warming therapies per order  - Administer medications as ordered  - Instruct and encourage patient and family to use good hand hygiene technique  - Identify and instruct in appropriate isolation precautions for identified infection/condition  Outcome: Progressing  Goal: Absence of fever/infection during neutropenic period  Description  INTERVENTIONS:  - Monitor WBC    Outcome: Progressing     Problem: SAFETY ADULT  Goal: Patient will remain free of falls  Description  INTERVENTIONS:  - Assess patient frequently for physical needs  -  Identify cognitive and physical deficits and behaviors that affect risk of falls    -  [place] fall precautions as indicated by assessment   - Educate patient/family on patient safety including physical limitations  - Instruct patient to call for assistance with activity based on assessment  - Modify environment to reduce risk of injury  - Consider OT/PT consult to assist with strengthening/mobility  Outcome: Progressing  Goal: Maintain or return to baseline ADL function  Description  INTERVENTIONS:  -  Assess patient's ability to carry out ADLs; assess patient's baseline for ADL function and identify physical deficits which impact ability to perform ADLs (bathing, care of mouth/teeth, toileting, grooming, dressing, etc )  - Assess/evaluate cause of self-care deficits   - Assess range of motion  - Assess patient's mobility; develop plan if impaired  - Assess patient's need for assistive devices and provide as appropriate  - Encourage maximum independence but intervene and supervise when necessary  - Involve family in performance of ADLs  - Assess for home care needs following discharge   - Consider OT consult to assist with ADL evaluation and planning for discharge  - Provide patient education as appropriate  Outcome: Progressing  Goal: Maintain or return mobility status to optimal level  Description  INTERVENTIONS:  - Assess patient's baseline mobility status (ambulation, transfers, stairs, etc )    - Identify cognitive and physical deficits and behaviors that affect mobility  - Identify mobility aids required to assist with transfers and/or ambulation (gait belt, sit-to-stand, lift, walker, cane, etc )  - [place] fall precautions as indicated by assessment  - Record patient progress and toleration of activity level on Mobility SBAR; progress patient to next Phase/Stage  - Instruct patient to call for assistance with activity based on assessment  - Consider rehabilitation consult to assist with strengthening/weightbearing, etc   Outcome: Progressing     Problem: DISCHARGE PLANNING  Goal: Discharge to home or other facility with appropriate resources  Description  INTERVENTIONS:  - Identify barriers to discharge w/patient and caregiver  - Arrange for needed discharge resources and transportation as appropriate  - Identify discharge learning needs (meds, wound care, etc )  - Arrange for interpretive services to assist at discharge as needed  - Refer to Case Management Department for coordinating discharge planning if the patient needs post-hospital services based on physician/advanced practitioner order or complex needs related to functional status, cognitive ability, or social support system  Outcome: Progressing

## 2019-12-27 NOTE — INTERVAL H&P NOTE
H&P reviewed  After examining the patient I find no changes in the patients condition since the H&P had been written  Preop testing was reviewed  All was acceptable  The pelvic ultrasound is unavailable either by report or by images  The patient is known to have a 15 week size uterus and biopsy-proven complex endometrial hyperplasia with atypia  We have recommended robotically assisted total laparoscopic hysterectomy bilateral salpingo-oophorectomy and possible mini laparotomy and possible staging  I have discussed this with the patient we have again discussed risks and benefits and I have addended the consent to include my name      Vitals:    12/27/19 0843   BP: 156/71   Pulse:    Resp:    Temp:    SpO2:

## 2019-12-27 NOTE — ANESTHESIA PROCEDURE NOTES
Peripheral Block    Patient location during procedure: OR  Start time: 12/27/2019 12:24 PM  Staffing  Anesthesiologist: Stephane Britton DO  Preanesthetic Checklist  Completed: patient identified, site marked, surgical consent, pre-op evaluation, timeout performed, IV checked, risks and benefits discussed and monitors and equipment checked  Peripheral Block  Patient position: supine  Prep: ChloraPrep  Patient monitoring: heart rate, cardiac monitor, continuous pulse ox and frequent blood pressure checks  Block type: TAP  Laterality: bilateral  Injection technique: single-shot  Procedures: ultrasound guided, Ultrasound guidance required for the procedure to increase accuracy and safety of medication placement and decrease risk of complications  Ultrasound permanent image saved  Needle  Needle type: Stimuplex   Needle gauge: 22 G  Needle length: 10 cm  Needle localization: ultrasound guidance  Assessment  Injection assessment: incremental injection, negative aspiration for heme and no paresthesia on injection  Heart rate change: no  Slow fractionated injection: yes  Post-procedure:  site cleaned  patient tolerated the procedure well with no immediate complications  Additional Notes  Surgeon requested TAP blocks following induction of General anesthesia  Procedure performed for post operative pain control

## 2019-12-27 NOTE — ANESTHESIA PREPROCEDURE EVALUATION
Review of Systems/Medical History  Patient summary reviewed  Chart reviewed  No history of anesthetic complications     Cardiovascular  Negative cardio ROS    Pulmonary  Negative pulmonary ROS        GI/Hepatic  Negative GI/hepatic ROS          Negative  ROS        Endo/Other  Negative endo/other ROS      GYN  Negative gynecology ROS          Hematology  Negative hematology ROS      Musculoskeletal  Negative musculoskeletal ROS        Neurology    Headaches,    Psychology   Negative psychology ROS              Physical Exam    Airway    Mallampati score: II  TM Distance: >3 FB  Neck ROM: full     Dental   No notable dental hx     Cardiovascular  Comment: Negative ROS, Rhythm: regular, Rate: normal, Cardiovascular exam normal    Pulmonary  Pulmonary exam normal Breath sounds clear to auscultation,     Other Findings        Anesthesia Plan  ASA Score- 2     Anesthesia Type- general with ASA Monitors  Additional Monitors:   Airway Plan:     Comment: SCOP patch ordered  Plan Factors-Patient not instructed to abstain from smoking on day of procedure  Patient did not smoke on day of surgery  Induction- intravenous  Postoperative Plan-     Informed Consent- Anesthetic plan and risks discussed with patient

## 2019-12-27 NOTE — DISCHARGE SUMMARY
Discharge Summary - Gynecologic Oncology  Lincoln Martinez 47 y o  female MRN: [id number]  Unit/Bed#: OR POOL Encounter: [id number]    Admission Date: 12/27/2019   Discharge Date: 12/31/2019    Attending Physician: Dr Jaime Novak Physician(s): None    Admitting Diagnosis:   Complex atypical endometrial hyperplasia [N85 02]    Discharge Diagnosis:    Complex hyperplasia with atypia  Status post ORLANDO/ BSO    Procedures Performed:  Exploratory laparotomy, Total abdominal hysterectomy and bilateral salpingo-oophorectomy    Hospital Course: Ms Lincoln Martinez is a 39-year-old female with biopsy-proven complex atypical endometrial hyperplasia with 15 week sized fibroid uterus who presented on day of admission for hysterectomy and bilateral salpingo-oophorectomy- on further examination of patient it was decided to proceed with exploratory laparotomy, ORLANDO and BSO  She received a TAPS block postoperatively to assist with pain control  She was admitted for pain control and postoperative management  Her procedure was uncomplicated  The patient's hospital course was complicated by urinary retention  On POD 1, the patient was doing well  Her pain was well controlled  Her Hb fell from 12 7 preoperatively to 10 9  postoperatively  Her Loredo was initially removed on POD #1 and she was able to void spontaneously  However on POD 3 she developed urinary retention requiring replacement of the loredo catheter  On POD #4 the loredo was removed and she passed a voiding trial   On the day of discharge she was tolerating PO, passing flatus and ambulating appropriately  The patient was discharged home on POD 4 in stable condition  She was given prescriptions for Percocet for pain control  She was asked to follow up in 2 weeks for a postoperative appointment       Lab Results:   Lab Results   Component Value Date    WBC 9 06 12/20/2019    HGB 12 7 12/20/2019    HCT 40 3 12/20/2019    MCV 91 12/20/2019     12/20/2019 Lab Results   Component Value Date    CALCIUM 9 2 12/20/2019    K 4 1 12/20/2019    CO2 29 12/20/2019     12/20/2019    BUN 12 12/20/2019    CREATININE 0 72 12/20/2019     No results found for: POCGLU  Lab Results   Component Value Date    PTT 31 12/20/2019     Lab Results   Component Value Date    INR 1 12 12/20/2019    PROTIME 14 0 12/20/2019       Pathology: At least complex hyperplasia with atypia    Condition at Discharge: stable     Discharge Medications: See after visit summary for reconciled discharge medications provided to patient and family  Discharge instructions/Information to patient and family: See after visit summary for information provided to patient and family  Provisions for Follow-Up Care: See after visit summary for information related to follow-up care and any pertinent home health orders        Disposition: Home    Planned Readmission: No    Code Status: Full Code      Zeyad Howell MD, MPH  OB/GYN, PGY4  12/31/2019, 3:23 PM

## 2019-12-27 NOTE — INTERVAL H&P NOTE
H&P reviewed  After examining the patient I find no changes in the patients condition since the H&P had been written      Vitals:    12/27/19 0843   BP: 156/71   Pulse:    Resp:    Temp:    SpO2:

## 2019-12-27 NOTE — DISCHARGE INSTRUCTIONS
[facility]  Drs Rhonda Ahumada and Angeli Crenshaw  [phone]    Hysterectomy Discharge Instructions    WHAT YOU NEED TO KNOW:   A hysterectomy is surgery to remove your uterus  Your ovaries, fallopian tubes, cervix, or part of your vagina may also need to be removed  The organs and tissue that will be removed depends on your medical condition  After a hysterectomy, you will not be able to become pregnant  If your ovaries are removed, you will go through menopause if you have not already  DISCHARGE INSTRUCTIONS:   Contact your doctor at the number above if:   · You have a fever over 101o  · You have nausea or are vomiting that does not improve after a light meal    · Your pain is getting worse, even after you take medicine  · You feel pain or burning when you urinate, or you have trouble urinating  · You have pus or a foul-smelling odor coming from your vagina  · Your wound is red, swollen, or draining pus  · You see new or an increased amount of bright red blood coming from your vagina or your incisions  · You have questions or concerns about your condition or care  Seek care immediately:   · Your arm or leg feels warm, tender, and painful  It may look swollen and red  · You have increasing abdominal or pelvic pain  · You have heavy vaginal bleeding that fills 1 or more sanitary pads in 1 hour  Call 911 for any of the following:   · You feel lightheaded, short of breath, and have chest pain  · You cough up blood  Medicines: You may need any of the following:  · Prescription medicine may be given  You may receive a prescription for pain medication or be advised to use over the counter (OTC) pain medication such as acetaminophen (Tylenol) or ibuprofen (Advil, Motrin)  Ask your healthcare provider how to take this medicine safely  · NSAIDs , such as ibuprofen, help decrease swelling, pain, and fever   NSAIDs can cause stomach bleeding or kidney problems in certain people  If you take blood thinner medicine, always ask your healthcare provider if NSAIDs are safe for you  Always read the medicine label and follow directions  · Stool softeners help treat or prevent constipation  · Take your medicine as directed  Contact your healthcare provider if you think your medicine is not helping or if you have side effects  Tell him or her if you are allergic to any medicine  Keep a list of the medicines, vitamins, and herbs you take  Include the amounts, and when and why you take them  Bring the list or the pill bottles to follow-up visits  Carry your medicine list with you in case of an emergency  Activity:   · Rest as needed  Get up and move around as directed to help prevent blood clots  Start with short walks and slowly increase the distance every day  Limit the number of times you climb stairs to 2 times each day for the first week  Plan most of your daily activities on one level of your home  · Do not lift objects heavier than 10 pounds for 6 weeks  Avoid strenuous activity for 2 weeks  · Do not strain during bowel movements  High-fiber foods and extra liquids can help you prevent constipation  Examples of high-fiber foods are fruit and bran  Prune juice and water are good liquids to drink  · Do not have sex, use tampons, or douche for up to 8 weeks  You may shower as soon as the day after surgery  Tub baths can be taken starting 2 weeks after surgery  Do not go into pools or hot tubs until cleared by your doctor  · Ask when it is safe for you to drive  It is generally safe to drive after 2 weeks and when no longer taking prescription pain medication  · Ask when you may return to work and to other regular activities  Wound care: Care for your abdominal incisions as directed  Carefully wash around the wound with soap and water   If you have Hibiclens or medicated soap that you were instructed to use before surgery, you may use that to wash with for up to 2 days after surgery  If not, any mild non-scented, non-abrasive soap is safe  It is okay to let the soap and water run over your incision  Do not scrub your incision  Dry the area and put on new, clean bandages as directed  Change your bandages when they get wet or dirty  If you have strips of medical tape, let them fall off on their own  It may take 7 to 14 days for them to fall off  Check your incision every day for redness, swelling, or pus  Deep breathing: Take deep breaths and cough 10 times each hour  This will decrease your risk for a lung infection  Take a deep breath and hold it for as long as you can  Let the air out and then cough strongly  Deep breaths help open your airway  You may be given an incentive spirometer to help you take deep breaths  Put the plastic piece in your mouth and take a slow, deep breath, then let the air out and cough  Repeat these steps 10 times every hour  Get support: This surgery may be life-changing for you and your family  You will no longer be able to get pregnant  Sudden changes in the levels of your hormones may occur and cause mood swings and depression  You may feel angry, sad, or frightened, or cry frequently and unexpectedly  These feelings are normal  Talk to your healthcare provider about where you can get support  You can also ask if hormone replacement medicine is right for you  Follow up with your healthcare provider or gynecologist as directed: You may need to return to have stitches removed, and for other tests  Write down your questions so you remember to ask them during your visits  © 2017 [employer] Information is for End User's use only and may not be sold, redistributed or otherwise used for commercial purposes  All illustrations and images included in CareNotes® are the copyrighted property of [employer]  or Luis Robles  The above information is an  only  It is not intended as medical advice for individual conditions or treatments  Talk to your doctor, nurse or pharmacist before following any medical regimen to see if it is safe and effective for you

## 2019-12-27 NOTE — ANESTHESIA POSTPROCEDURE EVALUATION
Post-Op Assessment Note    CV Status:  Stable    Pain management: adequate     Mental Status:  Alert and awake   Hydration Status:  Euvolemic   PONV Controlled:  Controlled   Airway Patency:  Patent  Airway: intubated   Post Op Vitals Reviewed: Yes      Staff: Anesthesiologist           /72 (12/27/19 1443)    Temp      Pulse 78 (12/27/19 1443)   Resp 13 (12/27/19 1443)    SpO2 94 % (12/27/19 1443)

## 2019-12-28 LAB
ANION GAP SERPL CALCULATED.3IONS-SCNC: 6 MMOL/L (ref 4–13)
BUN SERPL-MCNC: 7 MG/DL (ref 5–25)
CALCIUM SERPL-MCNC: 7.4 MG/DL (ref 8.3–10.1)
CHLORIDE SERPL-SCNC: 108 MMOL/L (ref 100–108)
CO2 SERPL-SCNC: 25 MMOL/L (ref 21–32)
CREAT SERPL-MCNC: 0.75 MG/DL (ref 0.6–1.3)
ERYTHROCYTE [DISTWIDTH] IN BLOOD BY AUTOMATED COUNT: 11.6 % (ref 11.6–15.1)
GFR SERPL CREATININE-BSD FRML MDRD: 93 ML/MIN/1.73SQ M
GLUCOSE SERPL-MCNC: 124 MG/DL (ref 65–140)
HCT VFR BLD AUTO: 35.1 % (ref 34.8–46.1)
HGB BLD-MCNC: 10.9 G/DL (ref 11.5–15.4)
MCH RBC QN AUTO: 29 PG (ref 26.8–34.3)
MCHC RBC AUTO-ENTMCNC: 31.1 G/DL (ref 31.4–37.4)
MCV RBC AUTO: 93 FL (ref 82–98)
PLATELET # BLD AUTO: 224 THOUSANDS/UL (ref 149–390)
PMV BLD AUTO: 9 FL (ref 8.9–12.7)
POTASSIUM SERPL-SCNC: 4.6 MMOL/L (ref 3.5–5.3)
RBC # BLD AUTO: 3.76 MILLION/UL (ref 3.81–5.12)
SODIUM SERPL-SCNC: 139 MMOL/L (ref 136–145)
WBC # BLD AUTO: 14.19 THOUSAND/UL (ref 4.31–10.16)

## 2019-12-28 PROCEDURE — 85027 COMPLETE CBC AUTOMATED: CPT | Performed by: STUDENT IN AN ORGANIZED HEALTH CARE EDUCATION/TRAINING PROGRAM

## 2019-12-28 PROCEDURE — 99024 POSTOP FOLLOW-UP VISIT: CPT | Performed by: OBSTETRICS & GYNECOLOGY

## 2019-12-28 PROCEDURE — 80048 BASIC METABOLIC PNL TOTAL CA: CPT | Performed by: STUDENT IN AN ORGANIZED HEALTH CARE EDUCATION/TRAINING PROGRAM

## 2019-12-28 RX ADMIN — ACETAMINOPHEN 650 MG: 325 TABLET ORAL at 16:23

## 2019-12-28 RX ADMIN — OXYCODONE HYDROCHLORIDE 5 MG: 5 TABLET ORAL at 19:38

## 2019-12-28 RX ADMIN — ACETAMINOPHEN 650 MG: 325 TABLET ORAL at 07:48

## 2019-12-28 RX ADMIN — ENOXAPARIN SODIUM 40 MG: 40 INJECTION SUBCUTANEOUS at 10:54

## 2019-12-28 RX ADMIN — OXYCODONE HYDROCHLORIDE 5 MG: 5 TABLET ORAL at 12:53

## 2019-12-28 RX ADMIN — DOCUSATE SODIUM 100 MG: 100 CAPSULE, LIQUID FILLED ORAL at 19:38

## 2019-12-28 RX ADMIN — DOCUSATE SODIUM 100 MG: 100 CAPSULE, LIQUID FILLED ORAL at 07:48

## 2019-12-28 RX ADMIN — ACETAMINOPHEN 650 MG: 325 TABLET ORAL at 22:42

## 2019-12-28 RX ADMIN — OXYCODONE HYDROCHLORIDE 5 MG: 5 TABLET ORAL at 07:48

## 2019-12-28 NOTE — UTILIZATION REVIEW
Initial Clinical Review    Elective ORLANDO/ BSO, (N/A)  LAPAROTOMY EXPLORATORY  surgical procedure  Age/Sex: 47 y o  female  Surgery Date: 12-27-19  Procedure: Diagnosis:  Complex atypical endometrial hyperplasia [N85 02]  Post-Op Diagnosis Codes:     * Complex atypical endometrial hyperplasia [N85 02]  Procedure(s) (LRB):  ORLANDO/ BSO, (N/A)  LAPAROTOMY EXPLORATORY (N/A)     Anesthesia: general  Operative Findings:*Complex atypical endometrial hyperplasia [N85 02]  Admission Orders: Date/Time/Statement: Inpatient Admission Orders (From admission, onward)     Ordered        12/27/19 1356  Inpatient Admission  Once                   Orders Placed This Encounter   Procedures    Inpatient Admission     Standing Status:   Standing     Number of Occurrences:   1     Order Specific Question:   Admitting Physician     Answer:   Corby Mead [[id number]]     Order Specific Question:   Level of Care     Answer:   Med Surg [[id number]]     Order Specific Question:   Estimated length of stay     Answer:   More than 2 Midnights     Order Specific Question:   Certification     Answer:   I certify that inpatient services are medically necessary for this patient for a duration of greater than two midnights  See H&P and MD Progress Notes for additional information about the patient's course of treatment       Vital Signs: /82 (BP Location: Right arm)   Pulse 90   Temp 98 9 °F (37 2 °C) (Tympanic)   Resp 18   Ht 5' 3" (1 6 m)   Wt 48 1 kg (106 lb)   LMP 12/13/2019 (Exact Date)   SpO2 99%   BMI 18 78 kg/m²   Diet:  As tolerated  Mobility: oob  DVT Prophylaxis: *scd  Medications/Pain Control:   Scheduled Medications:    Medications:  docusate sodium 100 mg Oral BID   enoxaparin 40 mg Subcutaneous Daily     Continuous IV Infusions:    lactated ringers 100 mL/hr Intravenous Continuous     PRN Meds:    acetaminophen 650 mg Oral Q6H PRN   ondansetron 4 mg Intravenous Q6H PRN   oxyCODONE 10 mg Oral Q4H PRN   oxyCODONE 5 mg Oral Q4H PRN simethicone 80 mg Oral Q6H PRN         Network Utilization Review Department  [email or web address]  ATTENTION: Please call with any questions or concerns to [phone] and carefully listen to the prompts so that you are directed to the right person  All voicemails are confidential   Candice Lambert all requests for admission clinical reviews, approved or denied determinations and any other requests to dedicated fax number below belonging to the campus where the patient is receiving treatment   List of dedicated fax numbers for the Facilities:  [address] DENIALS (Administrative/Medical Necessity) [phone]   1000  [facility] (Maternity/NICU/Pediatrics) [phone] 5400 [facility] [phone]   [facility] [phone]   Byron Perez [phone]   [facility] [phone]   [address] [place] [address] [phone]   [facility]  [phone]   [address] [facility]  [address] [facility] [address] [facility] [phone]

## 2019-12-28 NOTE — PROGRESS NOTES
Gyn Oncology Progress note   Goldie Stevens 47 y o  female MRN: [id number]  Unit/Bed#: E2 -01 Encounter: [id number]    A/P: Goldie Stevens is a 47 y o  female s/p ORLANDO and BSO for complex endometrial hyperplasia with atypia, POD#1, stable    1  S/p ORLANDO/BSO: at least complex endometrial hyperplasia with atypia, follow up final pathology, f/u AM labs  2  Pain: tylenol scheduled, roxicodone 5/10 prn  3  FEN: regular diet, D5+1/2 NS @ 100cc/hr  4  DVT ppx: SCDs, lovenox 40 mg daily  5  Anticipate discharge tomorrow    Subjective:    No acute events overnight  Pain is well controlled  Pt is tolerating PO  Pt is passing flatus  Pt is not ambulating  Denies fevers/chills  Mancuso was removed this morning  Review of Systems   Constitutional: Negative for chills and fever  Eyes: Negative for visual disturbance  Respiratory: Negative for shortness of breath  Cardiovascular: Negative for chest pain  Gastrointestinal: Negative for constipation, diarrhea, nausea and vomiting  Genitourinary: Negative for pelvic pain, urgency, vaginal bleeding, vaginal discharge and vaginal pain  Neurological: Negative for headaches  /82 (BP Location: Right arm)   Pulse 90   Temp 98 9 °F (37 2 °C) (Tympanic)   Resp 18   Ht 5' 3" (1 6 m)   Wt 48 1 kg (106 lb)   LMP 12/13/2019 (Exact Date)   SpO2 99%   BMI 18 78 kg/m²     I/O last 3 completed shifts: In: 3400 [P O :400; I V :3000]  Out: 2600 [Urine:2550; Blood:50]  No intake/output data recorded  Lab Results   Component Value Date    WBC 14 19 (H) 12/28/2019    HGB 10 9 (L) 12/28/2019    HCT 35 1 12/28/2019    MCV 93 12/28/2019     12/28/2019       Lab Results   Component Value Date    CALCIUM 7 4 (L) 12/28/2019    K 4 6 12/28/2019    CO2 25 12/28/2019     12/28/2019    BUN 7 12/28/2019    CREATININE 0 75 12/28/2019       No results found for: POCGLU    Physical Exam   Constitutional: She is oriented to person, place, and time   She appears well-developed and well-nourished  Cardiovascular: Normal rate and regular rhythm  Pulmonary/Chest: Effort normal and breath sounds normal    Abdominal: Bowel sounds are normal  There is no tenderness  There is no rebound and no guarding  Incision is c/d/i   Musculoskeletal: Normal range of motion  She exhibits no tenderness  Neurological: She is alert and oriented to person, place, and time  Psychiatric: She has a normal mood and affect  Nursing note and vitals reviewed      Aniket Mitchell MD   PGY-3 GYN/ONC  12/28/2019 8:43 AM

## 2019-12-29 PROCEDURE — 99024 POSTOP FOLLOW-UP VISIT: CPT | Performed by: OBSTETRICS & GYNECOLOGY

## 2019-12-29 PROCEDURE — 0T9B70Z DRAINAGE OF BLADDER WITH DRAINAGE DEVICE, VIA NATURAL OR ARTIFICIAL OPENING: ICD-10-PCS | Performed by: OBSTETRICS & GYNECOLOGY

## 2019-12-29 RX ORDER — KETOROLAC TROMETHAMINE 30 MG/ML
15 INJECTION, SOLUTION INTRAMUSCULAR; INTRAVENOUS EVERY 6 HOURS SCHEDULED
Status: DISPENSED | OUTPATIENT
Start: 2019-12-29 | End: 2019-12-30

## 2019-12-29 RX ADMIN — ENOXAPARIN SODIUM 40 MG: 40 INJECTION SUBCUTANEOUS at 08:44

## 2019-12-29 RX ADMIN — OXYCODONE HYDROCHLORIDE 10 MG: 10 TABLET ORAL at 05:14

## 2019-12-29 RX ADMIN — DOCUSATE SODIUM 100 MG: 100 CAPSULE, LIQUID FILLED ORAL at 08:44

## 2019-12-29 RX ADMIN — DOCUSATE SODIUM 100 MG: 100 CAPSULE, LIQUID FILLED ORAL at 17:13

## 2019-12-29 RX ADMIN — KETOROLAC TROMETHAMINE 15 MG: 30 INJECTION, SOLUTION INTRAMUSCULAR at 17:12

## 2019-12-29 RX ADMIN — OXYCODONE HYDROCHLORIDE 5 MG: 5 TABLET ORAL at 19:15

## 2019-12-29 RX ADMIN — KETOROLAC TROMETHAMINE 15 MG: 30 INJECTION, SOLUTION INTRAMUSCULAR at 11:46

## 2019-12-29 RX ADMIN — OXYCODONE HYDROCHLORIDE 5 MG: 5 TABLET ORAL at 14:27

## 2019-12-29 RX ADMIN — OXYCODONE HYDROCHLORIDE 5 MG: 5 TABLET ORAL at 09:55

## 2019-12-29 RX ADMIN — OXYCODONE HYDROCHLORIDE 10 MG: 10 TABLET ORAL at 00:54

## 2019-12-29 NOTE — PROGRESS NOTES
Gyn Oncology Progress note   Patience Baca 47 y o  female MRN: [id number]  Unit/Bed#: E2 -01 Encounter: [id number]    Complex endometrial hyperplasia with atypia: Patience Baca is a 47 y o  S/p ORLANDO and BSO for complex endometrial hyperplasia with atypia, POD#2, stable     Plan:    1) Post-operative day 2  - Intra-op: at least endometrial hyperplasia with atypia, final pathology  - Hemoglobin 10 9    2) Pain   - scheduled Toradol q 6 hours  - scheduled Tylenol, roxicodone 5/10    3) FEN  - Regular diet   - D5/0 45NS at 100cc/hour     4) DVT ppx  - SCDs  - Lovenox    Anticipate discharge POD#3      Subjective:    Patience Baca is doing well  She is tolerating po and passign flatus  She has not yet had a bowel movement since surgery  She is ambulating to the toilet on her own  She has good urine output  Patient has abdominal pain, particularly with movement  She denies nausea or vomitting  Patient denies fever, chills, chest pain, shortness of breath, or calf tenderness  /76 (BP Location: Right arm)   Pulse 70   Temp 97 5 °F (36 4 °C) (Tympanic)   Resp 16   Ht 5' 3" (1 6 m)   Wt 48 1 kg (106 lb)   LMP 12/13/2019 (Exact Date)   SpO2 97%   BMI 18 78 kg/m²     I/O last 3 completed shifts: In: 760 [P O :760]  Out: 5010 [Urine:5010]  I/O this shift:  In: -   Out: 250 [Urine:250]    Lab Results   Component Value Date    WBC 14 19 (H) 12/28/2019    HGB 10 9 (L) 12/28/2019    HCT 35 1 12/28/2019    MCV 93 12/28/2019     12/28/2019       Lab Results   Component Value Date    CALCIUM 7 4 (L) 12/28/2019    K 4 6 12/28/2019    CO2 25 12/28/2019     12/28/2019    BUN 7 12/28/2019    CREATININE 0 75 12/28/2019           Physical Exam   Constitutional: She is oriented to person, place, and time  She appears well-developed and well-nourished  No distress  HENT:   Head: Normocephalic and atraumatic  Neck: Normal range of motion  Cardiovascular: Normal rate, regular rhythm and normal heart sounds  Exam reveals no gallop and no friction rub  No murmur heard  Pulmonary/Chest: Effort normal and breath sounds normal  No respiratory distress  She has no wheezes  She exhibits no tenderness  Abdominal: Soft  She exhibits no distension  There is tenderness  There is guarding  Vertical incision is clean, dry and intact with exophin in place    Neurological: She is alert and oriented to person, place, and time  Skin: Skin is warm and dry  No rash noted  No erythema  No pallor  Psychiatric: She has a normal mood and affect   Her behavior is normal  Judgment and thought content normal          Merlene Zamorano MD  12/29/2019  10:28 AM

## 2019-12-30 PROCEDURE — 99024 POSTOP FOLLOW-UP VISIT: CPT | Performed by: OBSTETRICS & GYNECOLOGY

## 2019-12-30 RX ADMIN — OXYCODONE HYDROCHLORIDE 5 MG: 5 TABLET ORAL at 05:42

## 2019-12-30 RX ADMIN — OXYCODONE HYDROCHLORIDE 5 MG: 5 TABLET ORAL at 10:12

## 2019-12-30 RX ADMIN — OXYCODONE HYDROCHLORIDE 10 MG: 10 TABLET ORAL at 15:15

## 2019-12-30 RX ADMIN — ACETAMINOPHEN 650 MG: 325 TABLET ORAL at 13:30

## 2019-12-30 RX ADMIN — OXYCODONE HYDROCHLORIDE 10 MG: 10 TABLET ORAL at 20:13

## 2019-12-30 RX ADMIN — OXYCODONE HYDROCHLORIDE 5 MG: 5 TABLET ORAL at 00:27

## 2019-12-30 RX ADMIN — KETOROLAC TROMETHAMINE 15 MG: 30 INJECTION, SOLUTION INTRAMUSCULAR at 00:21

## 2019-12-30 RX ADMIN — DOCUSATE SODIUM 100 MG: 100 CAPSULE, LIQUID FILLED ORAL at 17:13

## 2019-12-30 RX ADMIN — DOCUSATE SODIUM 100 MG: 100 CAPSULE, LIQUID FILLED ORAL at 09:06

## 2019-12-30 NOTE — PROGRESS NOTES
Gyn Oncology Progress note   Kris Gomes 47 y o  female MRN: [id number]  Unit/Bed#: E2 -01 Encounter: [id number]    Kris Gomes has no current complaints  Pain is well controlled with pain medication  Patient is currently voiding but did have some urinary retention overnight  She was bladder scanned for 600 cc and then straight cathed for 800 cc  She is ambulating  Patient is currently passing flatus and has had no bowel movement  She is tolerating PO, and denies nausea or vomitting  Patient denies fever, chills, chest pain, shortness of breath, or calf tenderness  /81 (BP Location: Right arm)   Pulse 88   Temp 97 7 °F (36 5 °C) (Tympanic)   Resp 16   Ht 5' 3" (1 6 m)   Wt 48 1 kg (106 lb)   LMP 12/13/2019 (Exact Date)   SpO2 100%   BMI 18 78 kg/m²     I/O last 3 completed shifts: In: 360 [P O :360]  Out: 5519 [Urine:5519]  I/O this shift:  In: -   Out: 4467 [Urine:4467]    Lab Results   Component Value Date    WBC 14 19 (H) 12/28/2019    HGB 10 9 (L) 12/28/2019    HCT 35 1 12/28/2019    MCV 93 12/28/2019     12/28/2019       Lab Results   Component Value Date    CALCIUM 7 4 (L) 12/28/2019    K 4 6 12/28/2019    CO2 25 12/28/2019     12/28/2019    BUN 7 12/28/2019    CREATININE 0 75 12/28/2019       No results found for: POCGLU    Physical Exam   Constitutional: She is oriented to person, place, and time  She appears well-developed and well-nourished  No distress  HENT:   Head: Normocephalic and atraumatic  Cardiovascular: Normal rate and regular rhythm  Pulmonary/Chest: Effort normal  No respiratory distress  She exhibits no tenderness  Abdominal: Soft  She exhibits no distension and no mass  There is tenderness (Appropriate)  There is no rebound  Midline vertical incision clean dry and intact   Musculoskeletal: Normal range of motion  She exhibits no tenderness  Neurological: She is alert and oriented to person, place, and time  Skin: She is not diaphoretic  Psychiatric: She has a normal mood and affect  Her behavior is normal    Nursing note and vitals reviewed         A/P: 47 y o  s/p total abdominal hysterectomy, bilateral salpingo-oophorectomy for complex endometrial hyperplasia with atypia POD# 3  1) complex atypical hyperplasia: s/p surgery, f/u final pathology as outpatient  2) Post operative Care:  - FEN:  Regular diet  - Pain:  Scheduled Tylenol, oxycodone, Toradol  - hemoglobin 10 9  - DVT ppx: SCDs, Lovenox  - Encouraged incentive spirometry to reduce atelectasis and pneumonia risk  - Encouraged ambulation as tolerated  3) Urinary Retention   -urinary retention overnight, bladder scan for 600 cc and straight cath for 800 cc   -plan to replace Mancuso this morning  4) Disposition   -today or tomorrow    Jazmin Adam MD, MPH  OB/GYN, PGY4  12/30/2019, 6:10 AM

## 2019-12-31 VITALS
OXYGEN SATURATION: 97 % | RESPIRATION RATE: 18 BRPM | DIASTOLIC BLOOD PRESSURE: 57 MMHG | BODY MASS INDEX: 18.78 KG/M2 | SYSTOLIC BLOOD PRESSURE: 136 MMHG | WEIGHT: 106 LBS | TEMPERATURE: 98.1 F | HEART RATE: 91 BPM | HEIGHT: 63 IN

## 2019-12-31 PROCEDURE — 90471 IMMUNIZATION ADMIN: CPT | Performed by: OBSTETRICS & GYNECOLOGY

## 2019-12-31 PROCEDURE — 90682 RIV4 VACC RECOMBINANT DNA IM: CPT | Performed by: OBSTETRICS & GYNECOLOGY

## 2019-12-31 PROCEDURE — 99024 POSTOP FOLLOW-UP VISIT: CPT | Performed by: OBSTETRICS & GYNECOLOGY

## 2019-12-31 RX ADMIN — DOCUSATE SODIUM 100 MG: 100 CAPSULE, LIQUID FILLED ORAL at 09:05

## 2019-12-31 RX ADMIN — INFLUENZA A VIRUS A/BRISBANE/02/2018 (H1N1) RECOMBINANT HEMAGGLUTININ ANTIGEN, INFLUENZA A VIRUS A/KANSAS/14/2017 (H3N2) RECOMBINANT HEMAGGLUTININ ANTIGEN, INFLUENZA B VIRUS B/PHUKET/3073/2013 RECOMBINANT HEMAGGLUTININ ANTIGEN, AND INFLUENZA B VIRUS B/MARYLAND/15/2016 RECOMBINANT HEMAGGLUTININ ANTIGEN 0.5 ML: 45; 45; 45; 45 INJECTION INTRAMUSCULAR at 09:05

## 2019-12-31 RX ADMIN — ACETAMINOPHEN 650 MG: 325 TABLET ORAL at 09:11

## 2019-12-31 RX ADMIN — OXYCODONE HYDROCHLORIDE 5 MG: 5 TABLET ORAL at 11:18

## 2019-12-31 RX ADMIN — OXYCODONE HYDROCHLORIDE 5 MG: 5 TABLET ORAL at 04:10

## 2019-12-31 RX ADMIN — OXYCODONE HYDROCHLORIDE 5 MG: 5 TABLET ORAL at 15:46

## 2019-12-31 NOTE — PROGRESS NOTES
Gyn Oncology Progress note   Patience Baca 47 y o  female MRN: [id number]  Unit/Bed#: E2 -01 Encounter: [id number]    Patience Baca has no current complaints  Pain is well controlled with pain medication  Patient is currently voiding via loredo due to urinary retention yesterday  She is ambulating  Patient is currently passing flatus and has had no bowel movement  She is tolerating PO, and denies nausea or vomitting  Patient denies fever, chills, chest pain, shortness of breath, or calf tenderness  /90 (BP Location: Right arm)   Pulse 83   Temp 98 6 °F (37 °C) (Tympanic)   Resp 18   Ht 5' 3" (1 6 m)   Wt 48 1 kg (106 lb)   LMP 12/13/2019 (Exact Date)   SpO2 98%   BMI 18 78 kg/m²     I/O last 3 completed shifts:  In: -   Out: 8976 [Urine:8976]  I/O this shift:  In: -   Out: 400 [Urine:400]    Lab Results   Component Value Date    WBC 14 19 (H) 12/28/2019    HGB 10 9 (L) 12/28/2019    HCT 35 1 12/28/2019    MCV 93 12/28/2019     12/28/2019       Lab Results   Component Value Date    CALCIUM 7 4 (L) 12/28/2019    K 4 6 12/28/2019    CO2 25 12/28/2019     12/28/2019    BUN 7 12/28/2019    CREATININE 0 75 12/28/2019       No results found for: POCGLU    Physical Exam   Constitutional: She is oriented to person, place, and time  She appears well-developed and well-nourished  No distress  HENT:   Head: Normocephalic and atraumatic  Eyes: Pupils are equal, round, and reactive to light  EOM are normal    Cardiovascular: Normal rate  Pulmonary/Chest: Effort normal and breath sounds normal  No respiratory distress  She exhibits no tenderness  Abdominal: Soft  She exhibits no distension and no mass  There is no tenderness  There is no rebound and no guarding  Midline vertical incision clean, dry and intact   Musculoskeletal: Normal range of motion  She exhibits no edema, tenderness or deformity  Neurological: She is alert and oriented to person, place, and time     Skin: She is not diaphoretic  Psychiatric: She has a normal mood and affect  Her behavior is normal    Nursing note and vitals reviewed  A/P: 47 y o  s/p total abdominal hysterectomy, bilateral salpingo-oophorectomy for complex endometrial hyperplasia with atypia POD# 4  1) complex atypical hyperplasia: s/p surgery, f/u final pathology as outpatient  2) Post operative Care:  - FEN:  Regular diet  - Pain:  Scheduled Tylenol, oxycodone, Toradol  - hemoglobin 10 9  - DVT ppx: SCDs, Lovenox  - Encouraged incentive spirometry to reduce atelectasis and pneumonia risk  - Encouraged ambulation as tolerated  3) Urinary Retention   -Good urine output overnight, Plan for loredo removal this morning followed by voiding trial  If failed voding trial will be discharged with a loredo    4) Disposition   -home today    Joseline Canales MD, MPH  OB/GYN, PGY4  12/31/2019, 6:17 AM

## 2019-12-31 NOTE — PLAN OF CARE
Problem: Prexisting or High Potential for Compromised Skin Integrity  Goal: Skin integrity is maintained or improved  Description  INTERVENTIONS:  - Identify patients at risk for skin breakdown  - Assess and monitor skin integrity  - Assess and monitor nutrition and hydration status  - Monitor labs   - Assess for incontinence   - Turn and reposition patient  - Assist with mobility/ambulation  - Relieve pressure over bony prominences  - Avoid friction and shearing  - Provide appropriate hygiene as needed including keeping skin clean and dry  - Evaluate need for skin moisturizer/barrier cream  - Collaborate with interdisciplinary team   - Patient/family teaching  - Consider wound care consult   Outcome: Progressing     Problem: PAIN - ADULT  Goal: Verbalizes/displays adequate comfort level or baseline comfort level  Description  Interventions:  - Encourage patient to monitor pain and request assistance  - Assess pain using appropriate pain scale  - Administer analgesics based on type and severity of pain and evaluate response  - Implement non-pharmacological measures as appropriate and evaluate response  - Consider cultural and social influences on pain and pain management  - Notify physician/advanced practitioner if interventions unsuccessful or patient reports new pain  Outcome: Progressing     Problem: INFECTION - ADULT  Goal: Absence or prevention of progression during hospitalization  Description  INTERVENTIONS:  - Assess and monitor for signs and symptoms of infection  - Monitor lab/diagnostic results  - Monitor all insertion sites, i e  indwelling lines, tubes, and drains  - Monitor endotracheal if appropriate and nasal secretions for changes in amount and color  - [place] appropriate cooling/warming therapies per order  - Administer medications as ordered  - Instruct and encourage patient and family to use good hand hygiene technique  - Identify and instruct in appropriate isolation precautions for identified infection/condition  Outcome: Progressing  Goal: Absence of fever/infection during neutropenic period  Description  INTERVENTIONS:  - Monitor WBC    Outcome: Progressing     Problem: SAFETY ADULT  Goal: Patient will remain free of falls  Description  INTERVENTIONS:  - Assess patient frequently for physical needs  -  Identify cognitive and physical deficits and behaviors that affect risk of falls    -  [place] fall precautions as indicated by assessment   - Educate patient/family on patient safety including physical limitations  - Instruct patient to call for assistance with activity based on assessment  - Modify environment to reduce risk of injury  - Consider OT/PT consult to assist with strengthening/mobility  Outcome: Progressing  Goal: Maintain or return to baseline ADL function  Description  INTERVENTIONS:  -  Assess patient's ability to carry out ADLs; assess patient's baseline for ADL function and identify physical deficits which impact ability to perform ADLs (bathing, care of mouth/teeth, toileting, grooming, dressing, etc )  - Assess/evaluate cause of self-care deficits   - Assess range of motion  - Assess patient's mobility; develop plan if impaired  - Assess patient's need for assistive devices and provide as appropriate  - Encourage maximum independence but intervene and supervise when necessary  - Involve family in performance of ADLs  - Assess for home care needs following discharge   - Consider OT consult to assist with ADL evaluation and planning for discharge  - Provide patient education as appropriate  Outcome: Progressing  Goal: Maintain or return mobility status to optimal level  Description  INTERVENTIONS:  - Assess patient's baseline mobility status (ambulation, transfers, stairs, etc )    - Identify cognitive and physical deficits and behaviors that affect mobility  - Identify mobility aids required to assist with transfers and/or ambulation (gait belt, sit-to-stand, lift, walker, cane, etc )  - [place] fall precautions as indicated by assessment  - Record patient progress and toleration of activity level on Mobility SBAR; progress patient to next Phase/Stage  - Instruct patient to call for assistance with activity based on assessment  - Consider rehabilitation consult to assist with strengthening/weightbearing, etc   Outcome: Progressing     Problem: DISCHARGE PLANNING  Goal: Discharge to home or other facility with appropriate resources  Description  INTERVENTIONS:  - Identify barriers to discharge w/patient and caregiver  - Arrange for needed discharge resources and transportation as appropriate  - Identify discharge learning needs (meds, wound care, etc )  - Arrange for interpretive services to assist at discharge as needed  - Refer to Case Management Department for coordinating discharge planning if the patient needs post-hospital services based on physician/advanced practitioner order or complex needs related to functional status, cognitive ability, or social support system  Outcome: Progressing

## 2020-01-03 ENCOUNTER — HOSPITAL ENCOUNTER (EMERGENCY)
Facility: HOSPITAL | Age: 50
Discharge: HOME | End: 2020-01-03
Attending: EMERGENCY MEDICINE | Admitting: EMERGENCY MEDICINE

## 2020-01-03 VITALS
TEMPERATURE: 98.3 F | SYSTOLIC BLOOD PRESSURE: 171 MMHG | OXYGEN SATURATION: 99 % | DIASTOLIC BLOOD PRESSURE: 79 MMHG | HEART RATE: 102 BPM | HEIGHT: 63 IN | RESPIRATION RATE: 17 BRPM | BODY MASS INDEX: 18.79 KG/M2 | WEIGHT: 106.04 LBS

## 2020-01-03 DIAGNOSIS — R33.8 POSTOPERATIVE URINARY RETENTION: Primary | ICD-10-CM

## 2020-01-03 DIAGNOSIS — N99.89 POSTOPERATIVE URINARY RETENTION: Primary | ICD-10-CM

## 2020-01-03 LAB
BACTERIA UR QL AUTO: ABNORMAL /HPF
BILIRUB UR QL STRIP: NEGATIVE
CLARITY UR: CLEAR
COLOR UR: YELLOW
GLUCOSE UR STRIP-MCNC: NEGATIVE MG/DL
HGB UR QL STRIP.AUTO: ABNORMAL
KETONES UR STRIP-MCNC: NEGATIVE MG/DL
LEUKOCYTE ESTERASE UR QL STRIP: NEGATIVE
NITRITE UR QL STRIP: NEGATIVE
NON-SQ EPI CELLS URNS QL MICRO: ABNORMAL /HPF
PH UR STRIP.AUTO: 8 [PH]
PROT UR STRIP-MCNC: NEGATIVE MG/DL
RBC #/AREA URNS AUTO: ABNORMAL /HPF
SP GR UR STRIP.AUTO: 1.01 (ref 1–1.03)
UROBILINOGEN UR QL STRIP.AUTO: 0.2 E.U./DL
WBC #/AREA URNS AUTO: ABNORMAL /HPF

## 2020-01-03 PROCEDURE — 99284 EMERGENCY DEPT VISIT MOD MDM: CPT | Performed by: PHYSICIAN ASSISTANT

## 2020-01-03 PROCEDURE — 99283 EMERGENCY DEPT VISIT LOW MDM: CPT

## 2020-01-03 PROCEDURE — 81001 URINALYSIS AUTO W/SCOPE: CPT | Performed by: PHYSICIAN ASSISTANT

## 2020-01-03 NOTE — ED PROVIDER NOTES
History  Chief Complaint   Patient presents with    Urinary Retention     pt presents to ED c/o urinary retention following a hysterectomy last friday  50-year-old female presents to the emergency department for evaluation of urinary retention  She is 7 days status post total abdominal hysterectomy and bilateral salpingo-oophorectomy, performed a complex endometrial hyperplasia and fibroid uterus  Her postoperative course was complicated by intermittent and urinary retention, she did have a Mancuso catheter placed for approximately 24 hours while inpatient  She was able to pass a voiding trial at time of discharge  States that she is having persistent abdominal pressure and inability to empty her bladder over the past several days  No reported fevers or chills  Denies dysuria when she is able to void  Having regular bowel movements without difficulty  She does endorse taking intermittent doses of oxycodone for postoperative pain  On exam, patient is conscious, alert, oriented with unremarkable vital signs  Midline abdominal laparotomy incision is well approximated without signs of wound dehiscence or erythema  Patient's lower abdomen is mildly distended with significant tenderness to palpation  No rigidity or peritoneal signs  Remainder of exam is unremarkable  A/P:  Postoperative urinary retention  Will check bladder scan and PVR, likely will place Mancuso catheter given that she has required 1 within the past week  Prior to Admission Medications   Prescriptions Last Dose Informant Patient Reported? Taking?    Multiple Vitamins-Calcium (ONE-A-DAY WOMENS FORMULA PO)   Yes No   Sig: Take by mouth   Probiotic Product (PROBIOTIC + TURMERIC EXTRACT) 400 MG CAPS   Yes No   Sig: Take 580 mg by mouth   acetaminophen (TYLENOL) 500 mg tablet   Yes No   Sig: Take 500 mg by mouth every 6 (six) hours as needed for mild pain   oxyCODONE (ROXICODONE) 5 mg immediate release tablet   No No   Si -2 tabs by mouth every 4-6 hour as needed      Facility-Administered Medications: None       Past Medical History:   Diagnosis Date    Abnormal uterine bleeding     hysterectomy today 12/27/2019    Headache     Motion sickness     Wears glasses        Past Surgical History:   Procedure Laterality Date    HYSTERECTOMY N/A 12/27/2019    Procedure: HYSTERECTOMY TOTAL ABDOMINAL ([place]); Surgeon: Cora Juarez MD;  Location: [facility];  Service: Gynecology Oncology    LAPAROTOMY N/A 12/27/2019    Procedure: Jacquie Sergioana;  Surgeon: Cora Juarez MD;  Location[facility];  Service: Gynecology Oncology    WISDOM TOOTH EXTRACTION         Family History   Problem Relation Age of Onset    Colon polyps Father     Colon cancer Maternal Aunt     Pancreatic cancer Paternal Uncle     Cancer Paternal Aunt      I have reviewed and agree with the history as documented  Social History     Tobacco Use    Smoking status: Never Smoker    Smokeless tobacco: Never Used   Substance Use Topics    Alcohol use: Yes     Frequency: Monthly or less     Comment: 1-2 times per year    Drug use: Never        Review of Systems   Constitutional: Negative for chills, diaphoresis and fever  Eyes: Negative for visual disturbance  Respiratory: Negative for cough and shortness of breath  Cardiovascular: Negative for chest pain and palpitations  Gastrointestinal: Negative for abdominal pain, diarrhea, nausea and vomiting  Genitourinary: Positive for difficulty urinating  Negative for dysuria, flank pain, frequency and urgency  Musculoskeletal: Negative for arthralgias and myalgias  Skin: Negative for color change, rash and wound  Allergic/Immunologic: Negative for immunocompromised state  Neurological: Negative for dizziness and light-headedness  Hematological: Does not bruise/bleed easily  Psychiatric/Behavioral: Negative for confusion  The patient is not nervous/anxious          Physical Exam  Physical Exam   Constitutional: She is oriented to person, place, and time  She appears well-developed and well-nourished  No distress  HENT:   Head: Normocephalic and atraumatic  Mouth/Throat: Oropharynx is clear and moist    Eyes: Pupils are equal, round, and reactive to light  No scleral icterus  Neck: No JVD present  Cardiovascular: Normal rate and regular rhythm  Exam reveals no gallop and no friction rub  No murmur heard  Pulmonary/Chest: No respiratory distress  She has no wheezes  She has no rales  Abdominal: Soft  Bowel sounds are normal  She exhibits no distension and no mass  There is tenderness (generalized)  There is no rebound and no guarding  Midline laparotomy incisional scar is well approximated without dehiscence or erythema   Musculoskeletal: She exhibits no edema  Neurological: She is alert and oriented to person, place, and time  Skin: Skin is warm and dry  Capillary refill takes less than 2 seconds  She is not diaphoretic  No pallor  Psychiatric: She has a normal mood and affect  Her behavior is normal    Vitals reviewed        Vital Signs  ED Triage Vitals   Temperature Pulse Respirations Blood Pressure SpO2   01/03/20 1513 01/03/20 1513 01/03/20 1513 01/03/20 1517 01/03/20 1513   98 3 °F (36 8 °C) 102 17 (!) 171/79 99 %      Temp Source Heart Rate Source Patient Position - Orthostatic VS BP Location FiO2 (%)   01/03/20 1513 01/03/20 1513 01/03/20 1513 01/03/20 1513 --   Temporal Monitor Sitting Left arm       Pain Score       01/03/20 1513       5           Vitals:    01/03/20 1513 01/03/20 1517   BP:  (!) 171/79   Pulse: 102    Patient Position - Orthostatic VS: Sitting          Visual Acuity      ED Medications  Medications - No data to display    Diagnostic Studies  Results Reviewed     Procedure Component Value Units Date/Time    Urine Microscopic [[id number]]  (Abnormal) Collected:  01/03/20 1601    Lab Status:  Final result Specimen:  Urine, Clean Catch Updated: 01/03/20 1622     RBC, UA 1-2 /hpf      WBC, UA None Seen /hpf      Epithelial Cells Occasional /hpf      Bacteria, UA Occasional /hpf     UA w Reflex to Microscopic w Reflex to Culture [[id number]]  (Abnormal) Collected:  01/03/20 1601    Lab Status:  Final result Specimen:  Urine, Clean Catch Updated:  01/03/20 1613     Color, UA Yellow     Clarity, UA Clear     Specific Gravity, UA 1 010     pH, UA 8 0     Leukocytes, UA Negative     Nitrite, UA Negative     Protein, UA Negative mg/dl      Glucose, UA Negative mg/dl      Ketones, UA Negative mg/dl      Urobilinogen, UA 0 2 E U /dl      Bilirubin, UA Negative     Blood, UA Trace-lysed                 No orders to display              Procedures  Procedures         ED Course  ED Course as of Jan 03 1726 Fri Jan 03, 2020   1540 Initial bladder scan approx 200-300mL  Pt able to void spontaneously, will check PVR                                  MDM  Number of Diagnoses or Management Options  Postoperative urinary retention: new and requires workup  Diagnosis management comments: Mancuso placed due to patient's inability to void greater than 20-30 cc at a time  She is educated on the typical prognosis of postoperative urinary retention    She is advised to follow up with her gyn surgeon next week for a potential void trial of warranted       Amount and/or Complexity of Data Reviewed  Clinical lab tests: ordered and reviewed  Tests in the medicine section of CPT®: ordered and reviewed  Review and summarize past medical records: yes  Independent visualization of images, tracings, or specimens: yes (urine)          Disposition  Final diagnoses:   Postoperative urinary retention     Time reflects when diagnosis was documented in both MDM as applicable and the Disposition within this note     Time User Action Codes Description Comment    1/3/2020  4:50 PM Pritesh Flores Add [N99 89,  R33 8] Postoperative urinary retention       ED Disposition     ED Disposition Condition Date/Time Comment    Discharge Stable Fri Go 3, 2020  4:50 PM Shelby Ernst discharge to home/self care  Follow-up Information     Follow up With Specialties Details Why Jesus Sanders MD Gynecologic Oncology Schedule an appointment as soon as possible for a visit   [address] [place] [address] Henny Leal   [phone]            Discharge Medication List as of 1/3/2020  4:51 PM      CONTINUE these medications which have NOT CHANGED    Details   acetaminophen (TYLENOL) 500 mg tablet Take 500 mg by mouth every 6 (six) hours as needed for mild pain, Historical Med      Multiple Vitamins-Calcium (ONE-A-DAY WOMENS FORMULA PO) Take by mouth, Historical Med      oxyCODONE (ROXICODONE) 5 mg immediate release tablet 1 -2 tabs by mouth every 4-6 hour as needed, Normal      Probiotic Product (PROBIOTIC + TURMERIC EXTRACT) 400 MG CAPS Take 580 mg by mouth, Historical Med           No discharge procedures on file      ED Provider  Electronically Signed by           Nithya Townsend PA-C  01/03/20 [id number]

## 2020-01-08 ENCOUNTER — APPOINTMENT (OUTPATIENT)
Dept: LAB | Facility: HOSPITAL | Age: 50
End: 2020-01-08
Attending: OBSTETRICS & GYNECOLOGY

## 2020-01-08 ENCOUNTER — OFFICE VISIT (OUTPATIENT)
Dept: GYNECOLOGIC ONCOLOGY | Facility: HOSPITAL | Age: 50
End: 2020-01-08

## 2020-01-08 VITALS
TEMPERATURE: 99.3 F | OXYGEN SATURATION: 99 % | BODY MASS INDEX: 18.61 KG/M2 | RESPIRATION RATE: 16 BRPM | DIASTOLIC BLOOD PRESSURE: 86 MMHG | SYSTOLIC BLOOD PRESSURE: 130 MMHG | WEIGHT: 105 LBS | HEART RATE: 96 BPM | HEIGHT: 63 IN

## 2020-01-08 DIAGNOSIS — Z90.79 STATUS POST TOTAL ABDOMINAL HYSTERECTOMY AND BILATERAL SALPINGO-OOPHORECTOMY (TAH-BSO): ICD-10-CM

## 2020-01-08 DIAGNOSIS — R33.8 ACUTE URINARY RETENTION: ICD-10-CM

## 2020-01-08 DIAGNOSIS — Z90.722 STATUS POST TOTAL ABDOMINAL HYSTERECTOMY AND BILATERAL SALPINGO-OOPHORECTOMY (TAH-BSO): ICD-10-CM

## 2020-01-08 DIAGNOSIS — Z90.710 STATUS POST TOTAL ABDOMINAL HYSTERECTOMY AND BILATERAL SALPINGO-OOPHORECTOMY (TAH-BSO): ICD-10-CM

## 2020-01-08 DIAGNOSIS — Z90.710 STATUS POST TOTAL ABDOMINAL HYSTERECTOMY AND BILATERAL SALPINGO-OOPHORECTOMY (TAH-BSO): Primary | ICD-10-CM

## 2020-01-08 DIAGNOSIS — Z90.722 STATUS POST TOTAL ABDOMINAL HYSTERECTOMY AND BILATERAL SALPINGO-OOPHORECTOMY (TAH-BSO): Primary | ICD-10-CM

## 2020-01-08 DIAGNOSIS — Z90.79 STATUS POST TOTAL ABDOMINAL HYSTERECTOMY AND BILATERAL SALPINGO-OOPHORECTOMY (TAH-BSO): Primary | ICD-10-CM

## 2020-01-08 DIAGNOSIS — R30.0 DYSURIA: ICD-10-CM

## 2020-01-08 LAB
ALBUMIN SERPL BCP-MCNC: 3.3 G/DL (ref 3.5–5)
ALP SERPL-CCNC: 56 U/L (ref 46–116)
ALT SERPL W P-5'-P-CCNC: 23 U/L (ref 12–78)
ANION GAP SERPL CALCULATED.3IONS-SCNC: 3 MMOL/L (ref 4–13)
AST SERPL W P-5'-P-CCNC: 10 U/L (ref 5–45)
BASOPHILS # BLD AUTO: 0.07 THOUSANDS/ΜL (ref 0–0.1)
BASOPHILS NFR BLD AUTO: 1 % (ref 0–1)
BILIRUB SERPL-MCNC: 0.61 MG/DL (ref 0.2–1)
BUN SERPL-MCNC: 13 MG/DL (ref 5–25)
CALCIUM SERPL-MCNC: 9.3 MG/DL (ref 8.3–10.1)
CHLORIDE SERPL-SCNC: 107 MMOL/L (ref 100–108)
CO2 SERPL-SCNC: 27 MMOL/L (ref 21–32)
CREAT SERPL-MCNC: 0.76 MG/DL (ref 0.6–1.3)
EOSINOPHIL # BLD AUTO: 0.28 THOUSAND/ΜL (ref 0–0.61)
EOSINOPHIL NFR BLD AUTO: 2 % (ref 0–6)
ERYTHROCYTE [DISTWIDTH] IN BLOOD BY AUTOMATED COUNT: 11.6 % (ref 11.6–15.1)
GFR SERPL CREATININE-BSD FRML MDRD: 92 ML/MIN/1.73SQ M
GLUCOSE SERPL-MCNC: 103 MG/DL (ref 65–140)
HCT VFR BLD AUTO: 40.1 % (ref 34.8–46.1)
HGB BLD-MCNC: 12.7 G/DL (ref 11.5–15.4)
IMM GRANULOCYTES # BLD AUTO: 0.07 THOUSAND/UL (ref 0–0.2)
IMM GRANULOCYTES NFR BLD AUTO: 1 % (ref 0–2)
LYMPHOCYTES # BLD AUTO: 1.36 THOUSANDS/ΜL (ref 0.6–4.47)
LYMPHOCYTES NFR BLD AUTO: 10 % (ref 14–44)
MCH RBC QN AUTO: 28.7 PG (ref 26.8–34.3)
MCHC RBC AUTO-ENTMCNC: 31.7 G/DL (ref 31.4–37.4)
MCV RBC AUTO: 91 FL (ref 82–98)
MONOCYTES # BLD AUTO: 0.74 THOUSAND/ΜL (ref 0.17–1.22)
MONOCYTES NFR BLD AUTO: 5 % (ref 4–12)
NEUTROPHILS # BLD AUTO: 11.83 THOUSANDS/ΜL (ref 1.85–7.62)
NEUTS SEG NFR BLD AUTO: 81 % (ref 43–75)
NRBC BLD AUTO-RTO: 0 /100 WBCS
PLATELET # BLD AUTO: 283 THOUSANDS/UL (ref 149–390)
PMV BLD AUTO: 9.4 FL (ref 8.9–12.7)
POTASSIUM SERPL-SCNC: 4.4 MMOL/L (ref 3.5–5.3)
PROT SERPL-MCNC: 7.2 G/DL (ref 6.4–8.2)
RBC # BLD AUTO: 4.42 MILLION/UL (ref 3.81–5.12)
SODIUM SERPL-SCNC: 137 MMOL/L (ref 136–145)
WBC # BLD AUTO: 14.35 THOUSAND/UL (ref 4.31–10.16)

## 2020-01-08 PROCEDURE — 99024 POSTOP FOLLOW-UP VISIT: CPT | Performed by: OBSTETRICS & GYNECOLOGY

## 2020-01-08 PROCEDURE — 36415 COLL VENOUS BLD VENIPUNCTURE: CPT

## 2020-01-08 PROCEDURE — 85025 COMPLETE CBC W/AUTO DIFF WBC: CPT

## 2020-01-08 PROCEDURE — 80053 COMPREHEN METABOLIC PANEL: CPT

## 2020-01-08 RX ORDER — CIPROFLOXACIN 250 MG/1
250 TABLET, FILM COATED ORAL EVERY 12 HOURS SCHEDULED
Qty: 6 TABLET | Refills: 0 | Status: SHIPPED | OUTPATIENT
Start: 2020-01-08 | End: 2020-01-11

## 2020-01-08 NOTE — ASSESSMENT & PLAN NOTE
60-year-old status post total abdominal hysterectomy and bilateral salpingo-oophorectomy on 12/27/2019 for a large fibroid uterus, preoperative diagnosis of complex atypical hyperplasia  Final pathology from the surgery is pending  She had postoperative acute urinary retention and had a Mancuso catheter placed on January 3rd  She has burning with the catheter in situ  The catheter was removed today  Her performance status is 0   1  Return in 1 week for follow-up

## 2020-01-08 NOTE — PROGRESS NOTES
Assessment/Plan:    Problem List Items Addressed This Visit        Genitourinary    Acute urinary retention     Postoperative urinary retention after total abdominal hysterectomy and bilateral salpingo-oophorectomy  Her catheter has been in for 5 days  It was removed in the office today  1  If she does not void in 6 hours, she is instructed to return to the emergency room for Mancuso catheter placement with leg bag   2  CT of abdomen pelvis to evaluate for fluid collection that may be causing her urinary retention  3  CBC and CMP          Relevant Orders    CBC and differential    Comprehensive metabolic panel    CT abdomen pelvis w contrast       Other    Status post total abdominal hysterectomy and bilateral salpingo-oophorectomy ([place]) - Primary     47year-old status post total abdominal hysterectomy and bilateral salpingo-oophorectomy on 12/27/2019 for a large fibroid uterus, preoperative diagnosis of complex atypical hyperplasia  Final pathology from the surgery is pending  She had postoperative acute urinary retention and had a Mancuso catheter placed on January 3rd  She has burning with the catheter in situ  The catheter was removed today  Her performance status is 0   1  Return in 1 week for follow-up  Relevant Orders    CBC and differential    Comprehensive metabolic panel    CT abdomen pelvis w contrast    Dysuria     Severe burning, possibly associated with catheter placement  Has been present essentially since surgery  1  Urinalysis  2  Empiric treatment with ciprofloxacin 250 mg b i d  For 3 days  She understands risks and benefits of super Floxin treatment agrees to proceed             Relevant Medications    ciprofloxacin (CIPRO) 250 mg tablet    Other Relevant Orders    UA w Reflex to Microscopic w Reflex to Culture            CHIEF COMPLAINT:  Postoperative evaluation, urinary retention       Problem:  Cancer Staging  No matching staging information was found for the patient  Previous therapy:   No history exists  Patient ID: Citlalli Cui is a 47 y o  female  50-year-old who underwent total abdominal hysterectomy and bilateral salpingo-oophorectomy on 12/27/2019  Final pathology is pending from the operation  Preoperative diagnosis was complex atypical hyperplasia and large fibroid uterus  Immediately postoperatively, she had difficulty voiding which persisted and became urinary retention for which she presented to the emergency department on 1/3/2020  A Mancuso catheter was placed  She had relief of symptoms but now has catheter associated burning  The urine is clear in the bag  She is afebrile  She is tolerating regular diet  She is ambulatory  No vaginal bleeding  She is having bowel movements  She does have pain with bowel movements  The following portions of the patient's history were reviewed and updated as appropriate: allergies, current medications, past family history, past medical history, past social history, past surgical history and problem list     Review of Systems      Current Outpatient Medications:     acetaminophen (TYLENOL) 500 mg tablet, Take 500 mg by mouth every 6 (six) hours as needed for mild pain, Disp: , Rfl:     ciprofloxacin (CIPRO) 250 mg tablet, Take 1 tablet (250 mg total) by mouth every 12 (twelve) hours for 3 days, Disp: 6 tablet, Rfl: 0    Multiple Vitamins-Calcium (ONE-A-DAY WOMENS FORMULA PO), Take by mouth, Disp: , Rfl:     oxyCODONE (ROXICODONE) 5 mg immediate release tablet, 1 -2 tabs by mouth every 4-6 hour as needed, Disp: 10 tablet, Rfl: 0    Probiotic Product (PROBIOTIC + TURMERIC EXTRACT) 400 MG CAPS, Take 580 mg by mouth, Disp: , Rfl:     Objective:    Blood pressure 130/86, pulse 96, temperature 99 3 °F (37 4 °C), temperature source Oral, resp  rate 16, height 5' 3" (1 6 m), weight 47 6 kg (105 lb), last menstrual period 12/13/2019, SpO2 99 %  Body mass index is 18 6 kg/m²    Body surface area is 1 47 meters squared  Physical Exam   Constitutional: She appears well-developed and well-nourished  Abdominal: Soft  She exhibits no distension  There is no tenderness  There is no rebound and no guarding     Genitourinary:   Genitourinary Comments: Mancuso catheter was removed   Skin:   Incision is clean dry and intact

## 2020-01-08 NOTE — LETTER
January 8, 2020     Jed Moreno, [address]  [address]  [facility]  [facility] [address]    Patient: Maria Alejandra Panda   YOB: 1970   Date of Visit: 1/8/2020       Dear Dr Lois Patel:    Thank you for referring Maria Alejandra Panda to me for evaluation  Below are my notes for this consultation  If you have questions, please do not hesitate to call me  I look forward to following your patient along with you  Sincerely,        Kathryn Lim MD        CC: No Recipients  Kathryn Lim MD  1/8/2020  1:03 PM  Sign at close encounter  Assessment/Plan:    Problem List Items Addressed This Visit        Genitourinary    Acute urinary retention     Postoperative urinary retention after total abdominal hysterectomy and bilateral salpingo-oophorectomy  Her catheter has been in for 5 days  It was removed in the office today  1  If she does not void in 6 hours, she is instructed to return to the emergency room for Mancuso catheter placement with leg bag   2  CT of abdomen pelvis to evaluate for fluid collection that may be causing her urinary retention  3  CBC and CMP          Relevant Orders    CBC and differential    Comprehensive metabolic panel    CT abdomen pelvis w contrast       Other    Status post total abdominal hysterectomy and bilateral salpingo-oophorectomy ([place]-BS[facility]) - Primary     47year-old status post total abdominal hysterectomy and bilateral salpingo-oophorectomy on 12/27/2019 for a large fibroid uterus, preoperative diagnosis of complex atypical hyperplasia  Final pathology from the surgery is pending  She had postoperative acute urinary retention and had a Mancuso catheter placed on January 3rd  She has burning with the catheter in situ  The catheter was removed today  Her performance status is 0   1  Return in 1 week for follow-up             Relevant Orders    CBC and differential    Comprehensive metabolic panel    CT abdomen pelvis w contrast    Dysuria     Severe burning, possibly associated with catheter placement  Has been present essentially since surgery  1  Urinalysis  2  Empiric treatment with ciprofloxacin 250 mg b i d  For 3 days  She understands risks and benefits of super Floxin treatment agrees to proceed  Relevant Medications    ciprofloxacin (CIPRO) 250 mg tablet    Other Relevant Orders    UA w Reflex to Microscopic w Reflex to Culture            CHIEF COMPLAINT:  Postoperative evaluation, urinary retention       Problem:  Cancer Staging  No matching staging information was found for the patient  Previous therapy:   No history exists  Patient ID: Carol Collins is a 47 y o  female  48-year-old who underwent total abdominal hysterectomy and bilateral salpingo-oophorectomy on 12/27/2019  Final pathology is pending from the operation  Preoperative diagnosis was complex atypical hyperplasia and large fibroid uterus  Immediately postoperatively, she had difficulty voiding which persisted and became urinary retention for which she presented to the emergency department on 1/3/2020  A Mancuso catheter was placed  She had relief of symptoms but now has catheter associated burning  The urine is clear in the bag  She is afebrile  She is tolerating regular diet  She is ambulatory  No vaginal bleeding  She is having bowel movements  She does have pain with bowel movements        The following portions of the patient's history were reviewed and updated as appropriate: allergies, current medications, past family history, past medical history, past social history, past surgical history and problem list     Review of Systems      Current Outpatient Medications:     acetaminophen (TYLENOL) 500 mg tablet, Take 500 mg by mouth every 6 (six) hours as needed for mild pain, Disp: , Rfl:     ciprofloxacin (CIPRO) 250 mg tablet, Take 1 tablet (250 mg total) by mouth every 12 (twelve) hours for 3 days, Disp: 6 tablet, Rfl: 0    Multiple Vitamins-Calcium (ONE-A-DAY WOMENS FORMULA PO), Take by mouth, Disp: , Rfl:     oxyCODONE (ROXICODONE) 5 mg immediate release tablet, 1 -2 tabs by mouth every 4-6 hour as needed, Disp: 10 tablet, Rfl: 0    Probiotic Product (PROBIOTIC + TURMERIC EXTRACT) 400 MG CAPS, Take 580 mg by mouth, Disp: , Rfl:     Objective:    Blood pressure 130/86, pulse 96, temperature 99 3 °F (37 4 °C), temperature source Oral, resp  rate 16, height 5' 3" (1 6 m), weight 47 6 kg (105 lb), last menstrual period 12/13/2019, SpO2 99 %  Body mass index is 18 6 kg/m²  Body surface area is 1 47 meters squared  Physical Exam   Constitutional: She appears well-developed and well-nourished  Abdominal: Soft  She exhibits no distension  There is no tenderness  There is no rebound and no guarding     Genitourinary:   Genitourinary Comments: Mancuso catheter was removed   Skin:   Incision is clean dry and intact

## 2020-01-08 NOTE — ASSESSMENT & PLAN NOTE
Severe burning, possibly associated with catheter placement  Has been present essentially since surgery  1  Urinalysis  2  Empiric treatment with ciprofloxacin 250 mg b i d  For 3 days  She understands risks and benefits of super Floxin treatment agrees to proceed

## 2020-01-08 NOTE — ASSESSMENT & PLAN NOTE
Postoperative urinary retention after total abdominal hysterectomy and bilateral salpingo-oophorectomy  Her catheter has been in for 5 days  It was removed in the office today  1  If she does not void in 6 hours, she is instructed to return to the emergency room for Mancuso catheter placement with leg bag   2  CT of abdomen pelvis to evaluate for fluid collection that may be causing her urinary retention    3  CBC and CMP

## 2020-01-11 ENCOUNTER — HOSPITAL ENCOUNTER (EMERGENCY)
Facility: HOSPITAL | Age: 50
Discharge: HOME | End: 2020-01-11
Attending: EMERGENCY MEDICINE | Admitting: EMERGENCY MEDICINE
Payer: COMMERCIAL

## 2020-01-11 VITALS
SYSTOLIC BLOOD PRESSURE: 146 MMHG | DIASTOLIC BLOOD PRESSURE: 64 MMHG | TEMPERATURE: 98.8 F | HEART RATE: 73 BPM | OXYGEN SATURATION: 100 % | RESPIRATION RATE: 20 BRPM

## 2020-01-11 DIAGNOSIS — R35.0 URINARY FREQUENCY: Primary | ICD-10-CM

## 2020-01-11 LAB
BACTERIA UR QL AUTO: ABNORMAL /HPF
BILIRUB UR QL STRIP: NEGATIVE
CLARITY UR: CLEAR
CLARITY, POC: CLEAR
COLOR UR: ABNORMAL
COLOR, POC: YELLOW
EXT BILIRUBIN, UA: ABNORMAL
EXT BLOOD URINE: ABNORMAL
EXT GLUCOSE, UA: ABNORMAL
EXT KETONES: ABNORMAL
EXT NITRITE, UA: ABNORMAL
EXT PH, UA: 5
EXT PROTEIN, UA: ABNORMAL
EXT SPECIFIC GRAVITY, UA: 1.01
EXT UROBILINOGEN: 0.2
GLUCOSE UR STRIP-MCNC: NEGATIVE MG/DL
HGB UR QL STRIP.AUTO: ABNORMAL
KETONES UR STRIP-MCNC: NEGATIVE MG/DL
LEUKOCYTE ESTERASE UR QL STRIP: ABNORMAL
MUCOUS THREADS UR QL AUTO: ABNORMAL
NITRITE UR QL STRIP: NEGATIVE
NON-SQ EPI CELLS URNS QL MICRO: ABNORMAL /HPF
PH UR STRIP.AUTO: 6 [PH]
PROT UR STRIP-MCNC: NEGATIVE MG/DL
RBC #/AREA URNS AUTO: ABNORMAL /HPF
SP GR UR STRIP.AUTO: 1.01 (ref 1–1.03)
UROBILINOGEN UR QL STRIP.AUTO: 0.2 E.U./DL
WBC # BLD EST: ABNORMAL 10*3/UL
WBC #/AREA URNS AUTO: ABNORMAL /HPF

## 2020-01-11 PROCEDURE — 99282 EMERGENCY DEPT VISIT SF MDM: CPT | Performed by: EMERGENCY MEDICINE

## 2020-01-11 PROCEDURE — 99283 EMERGENCY DEPT VISIT LOW MDM: CPT

## 2020-01-11 PROCEDURE — 81001 URINALYSIS AUTO W/SCOPE: CPT | Performed by: EMERGENCY MEDICINE

## 2020-01-11 NOTE — ED NOTES
Bladder scan 141, Dr Sushant Hauser at bedside    Patient currently being treated for infection     Pauline Cheatham, [place]  01/11/20 3621

## 2020-01-11 NOTE — ED PROVIDER NOTES
History  Chief Complaint   Patient presents with    Bladder Problem     pt had recent hysterectomy  Pt  worried that she is not urinating enough, although she does state she urinates frequently, but smaller amounts  This 53-year-old female is 1 week status post abdominal hysterectomy  That was followed by brief period of urinary retention and had Mancuso catheter placed for several days  Catheter was removed 3 days ago patient completed 3 days course of Cipro today  She is concerned that she is urinating frequently in small amounts  She denies fever, dysuria, vaginal bleeding, flank pain, nausea or chills  Prior to Admission Medications   Prescriptions Last Dose Informant Patient Reported? Taking? Multiple Vitamins-Calcium (ONE-A-DAY WOMENS FORMULA PO) Past Month at Unknown time  Yes Yes   Sig: Take by mouth   Probiotic Product (PROBIOTIC + TURMERIC EXTRACT) 400 MG CAPS Not Taking at Unknown time  Yes No   Sig: Take 580 mg by mouth   acetaminophen (TYLENOL) 500 mg tablet 1/10/2020 at Unknown time  Yes Yes   Sig: Take 500 mg by mouth every 6 (six) hours as needed for mild pain   ciprofloxacin (CIPRO) 250 mg tablet Not Taking at Unknown time  No No   Sig: Take 1 tablet (250 mg total) by mouth every 12 (twelve) hours for 3 days   Patient not taking: Reported on 2020   oxyCODONE (ROXICODONE) 5 mg immediate release tablet Not Taking at Unknown time  No No   Si -2 tabs by mouth every 4-6 hour as needed   Patient not taking: Reported on 2020      Facility-Administered Medications: None       Past Medical History:   Diagnosis Date    Abnormal uterine bleeding     hysterectomy today 2019    Headache     Motion sickness     Wears glasses        Past Surgical History:   Procedure Laterality Date    HYSTERECTOMY N/A 2019    Procedure: HYSTERECTOMY TOTAL ABDOMINAL ([place]);   Surgeon: Cherry Canchola MD;  Location: [facility];  Service: Gynecology Oncology    LAPAROTOMY N/A 12/27/2019    Procedure: LAPAROTOMY EXPLORATORY;  Surgeon: Flower Eric MD;  Location[facility];  Service: Gynecology Oncology    WISDOM TOOTH EXTRACTION         Family History   Problem Relation Age of Onset    Colon polyps Father     Colon cancer Maternal Aunt     Pancreatic cancer Paternal Uncle     Cancer Paternal Aunt      I have reviewed and agree with the history as documented  Social History     Tobacco Use    Smoking status: Never Smoker    Smokeless tobacco: Never Used   Substance Use Topics    Alcohol use: Yes     Frequency: Monthly or less     Comment: 1-2 times per year    Drug use: Never        Review of Systems   Constitutional: Negative  HENT: Negative  Eyes: Negative  Respiratory: Negative  Cardiovascular: Negative  Gastrointestinal: Negative  Endocrine: Negative  Genitourinary: Positive for decreased urine volume, frequency and urgency  Negative for difficulty urinating, dysuria, flank pain, hematuria, vaginal bleeding and vaginal discharge  Musculoskeletal: Negative  Skin: Negative  Allergic/Immunologic: Negative  Neurological: Negative  Hematological: Negative  Psychiatric/Behavioral: Negative  All other systems reviewed and are negative  Physical Exam  Physical Exam   Constitutional: She is oriented to person, place, and time  She appears well-developed and well-nourished  HENT:   Head: Normocephalic and atraumatic  Eyes: Pupils are equal, round, and reactive to light  Conjunctivae and EOM are normal    Neck: Normal range of motion  Neck supple  No JVD present  Cardiovascular: Normal rate and regular rhythm  No murmur heard  Pulmonary/Chest: Effort normal and breath sounds normal    Abdominal: Soft  Bowel sounds are normal  She exhibits no distension and no mass  There is no tenderness  There is no rebound and no guarding  Midline suprapubic surgical wound is clean and closed with Dermabond    No signs of infection, dehiscence   Musculoskeletal: Normal range of motion  She exhibits no edema  Neurological: She is alert and oriented to person, place, and time  She has normal reflexes  No cranial nerve deficit  Coordination normal    Skin: Skin is warm and dry  Capillary refill takes less than 2 seconds  No rash noted  Psychiatric: She has a normal mood and affect  Nursing note and vitals reviewed        Vital Signs  ED Triage Vitals   Temperature Pulse Respirations Blood Pressure SpO2   01/11/20 1654 01/11/20 1653 01/11/20 1653 01/11/20 1653 01/11/20 1653   98 8 °F (37 1 °C) 90 18 166/79 98 %      Temp Source Heart Rate Source Patient Position - Orthostatic VS BP Location FiO2 (%)   01/11/20 1654 01/11/20 1653 01/11/20 1653 01/11/20 1653 --   Tympanic Monitor Lying Right arm       Pain Score       01/11/20 1653       2           Vitals:    01/11/20 1653 01/11/20 1730   BP: 166/79 146/64   Pulse: 90 73   Patient Position - Orthostatic VS: Lying          Visual Acuity      ED Medications  Medications - No data to display    Diagnostic Studies  Results Reviewed     Procedure Component Value Units Date/Time    Urine Microscopic [[id number]]  (Abnormal) Collected:  01/11/20 1752    Lab Status:  Final result Specimen:  Urine, Clean Catch Updated:  01/11/20 1835     RBC, UA 0-1 /hpf      WBC, UA 0-1 /hpf      Epithelial Cells Occasional /hpf      Bacteria, UA Occasional /hpf      MUCUS THREADS Occasional    UA (URINE) with reflex to Scope [[id number]]  (Abnormal) Collected:  01/11/20 1752    Lab Status:  Final result Specimen:  Urine, Clean Catch Updated:  01/11/20 1800     Color, UA Straw     Clarity, UA Clear     Specific Gravity, UA 1 010     pH, UA 6 0     Leukocytes, UA Small     Nitrite, UA Negative     Protein, UA Negative mg/dl      Glucose, UA Negative mg/dl      Ketones, UA Negative mg/dl      Urobilinogen, UA 0 2 E U /dl      Bilirubin, UA Negative     Blood, UA Trace-Intact    POCT urinalysis dipstick [[id number]] (Abnormal) Resulted:  01/11/20 1726    Lab Status:  Final result Specimen:  Urine Updated:  01/11/20 1727     Color, UA YELLOW     Clarity, UA CLEAR     Glucose, UA (Ref: Negative) NEG     Bilirubin, UA (Ref: Negative) NEG     Ketones, UA (Ref: Negative) NEG     Spec Grav, UA (Ref:1 003-1 030) 1 015     Blood, UA (Ref: Negative) SMALL     pH, UA (Ref: 4 5-8 0) 5 0     Protein, UA (Ref: Negative) NEG     Urobilinogen, UA (Ref: 0 2- 1 0) 0 2      Leukocytes, UA (Ref: Negative) MODERATE     Nitrite, UA (Ref: Negative) NEG                 No orders to display              Procedures  Procedures         ED Course                               MDM  Number of Diagnoses or Management Options  Urinary frequency: established and worsening  Diagnosis management comments: Status post recent hysterectomy and Mancuso catheter removal   No signs of UTI  Abdomen benign with well-healing wound  Patient has scheduled follow-up CT scan of the pelvis in several days with her surgeon  Amount and/or Complexity of Data Reviewed  Clinical lab tests: ordered and reviewed          Disposition  Final diagnoses:   Urinary frequency     Time reflects when diagnosis was documented in both MDM as applicable and the Disposition within this note     Time User Action Codes Description Comment    1/11/2020  5:59 PM Samreen Moctezuma Add [R35 0] Urinary frequency       ED Disposition     ED Disposition Condition Date/Time Comment    Discharge Stable Sat Jan 11, 2020  5:59 PM Ray Chi discharge to home/self care              Follow-up Information     Follow up With Specialties Details Why Contact Info    Matti Lemus MD Gynecologic Oncology Call  As needed Lissy  [address] [place] [address]  [phone]            Discharge Medication List as of 1/11/2020  6:00 PM      CONTINUE these medications which have NOT CHANGED    Details   acetaminophen (TYLENOL) 500 mg tablet Take 500 mg by mouth every 6 (six) hours as needed for mild pain, Historical Med      Multiple Vitamins-Calcium (ONE-A-DAY WOMENS FORMULA PO) Take by mouth, Historical Med      ciprofloxacin (CIPRO) 250 mg tablet Take 1 tablet (250 mg total) by mouth every 12 (twelve) hours for 3 days, Starting Wed 1/8/2020, Until Sat 1/11/2020, Normal      oxyCODONE (ROXICODONE) 5 mg immediate release tablet 1 -2 tabs by mouth every 4-6 hour as needed, Normal      Probiotic Product (PROBIOTIC + TURMERIC EXTRACT) 400 MG CAPS Take 580 mg by mouth, Historical Med           No discharge procedures on file      ED Provider  Electronically Signed by           Go Perez DO  01/12/20 6780

## 2020-01-11 NOTE — ED NOTES
Call bell within reach, bed low position     Samia Grace, [facility]  01/11/20 9984

## 2020-01-11 NOTE — DISCHARGE INSTRUCTIONS
We will call you if the urinalysis looks abnormal   If you do not hear from us follow up with your doctor as scheduled or sooner if problem arises

## 2020-01-14 ENCOUNTER — HOSPITAL ENCOUNTER (OUTPATIENT)
Dept: CT IMAGING | Facility: HOSPITAL | Age: 50
Discharge: HOME | End: 2020-01-14
Attending: OBSTETRICS & GYNECOLOGY
Payer: COMMERCIAL

## 2020-01-14 DIAGNOSIS — Z90.710 STATUS POST TOTAL ABDOMINAL HYSTERECTOMY AND BILATERAL SALPINGO-OOPHORECTOMY (TAH-BSO): ICD-10-CM

## 2020-01-14 DIAGNOSIS — Z90.722 STATUS POST TOTAL ABDOMINAL HYSTERECTOMY AND BILATERAL SALPINGO-OOPHORECTOMY (TAH-BSO): ICD-10-CM

## 2020-01-14 DIAGNOSIS — R33.8 ACUTE URINARY RETENTION: ICD-10-CM

## 2020-01-14 DIAGNOSIS — Z90.79 STATUS POST TOTAL ABDOMINAL HYSTERECTOMY AND BILATERAL SALPINGO-OOPHORECTOMY (TAH-BSO): ICD-10-CM

## 2020-01-14 PROBLEM — C54.1 ENDOMETRIAL CANCER (HCC): Status: ACTIVE | Noted: 2020-01-14

## 2020-01-14 PROCEDURE — 74177 CT ABD & PELVIS W/CONTRAST: CPT

## 2020-01-14 RX ADMIN — IOHEXOL 100 ML: 350 INJECTION, SOLUTION INTRAVENOUS at 12:56

## 2020-01-15 ENCOUNTER — OFFICE VISIT (OUTPATIENT)
Dept: GYNECOLOGIC ONCOLOGY | Facility: HOSPITAL | Age: 50
End: 2020-01-15

## 2020-01-15 VITALS
SYSTOLIC BLOOD PRESSURE: 126 MMHG | DIASTOLIC BLOOD PRESSURE: 80 MMHG | BODY MASS INDEX: 18.29 KG/M2 | WEIGHT: 103.2 LBS | HEART RATE: 90 BPM | TEMPERATURE: 99.9 F | HEIGHT: 63 IN

## 2020-01-15 DIAGNOSIS — C54.1 ENDOMETRIAL CANCER (HCC): Primary | ICD-10-CM

## 2020-01-15 PROCEDURE — 99024 POSTOP FOLLOW-UP VISIT: CPT | Performed by: OBSTETRICS & GYNECOLOGY

## 2020-01-16 NOTE — PROGRESS NOTES
Assessment/Plan:    Problem List Items Addressed This Visit        Genitourinary    Endometrial cancer ([facility] [address] ) - Primary     50-year-old with stage IA grade 1 endometrial cancer  There was no lymphovascular space invasion  Mismatch repair was intact  She is recovering well from surgery  Her urinary retention has resolved  CT did not show any evidence of abdominal or pelvic pathology  Her performance status is 0   1  Return in 4 weeks for postoperative examination  2  I discussed that she does not require adjuvant therapy for her early stage endometrial cancer  CHIEF COMPLAINT:  Postoperative evaluation       Problem:  Cancer Staging  Endometrial cancer [facility])  Staging form: Corpus Uteri - Carcinoma, AJCC 8th Edition  - Clinical stage from 1/14/2020: FIGO Stage I (cT1, cN0, cM0) - Signed by Sara Ramires MD on 1/14/2020  - Pathologic stage from 1/14/2020: FIGO Stage IA (pT1a, pN0, cM0) - Signed by Sara Ramires MD on 1/14/2020        Previous therapy:     Endometrial cancer ([facility] [address] )    12/27/2019 Surgery     Total abdominal hysterectomy bilateral salpingo-oophorectomy for stage IA grade 1 noninvasive endometrial adenocarcinoma  No further treatment required    Gorman syndrome negative      1/14/2020 Initial Diagnosis     Endometrial cancer ([facility] [address] )      1/14/2020 -  Cancer Staged     Staging form: Corpus Uteri - Carcinoma, AJCC 8th Edition  - Pathologic stage from 1/14/2020: FIGO Stage IA (pT1a, pN0, cM0) - Signed by Sara Ramires MD on 1/14/2020  Stage used in treatment planning: Yes  National guidelines used in treatment planning: Yes        1/14/2020 -  Cancer Staged     Staging form: Corpus Uteri - Carcinoma, AJCC 8th Edition  - Clinical stage from 1/14/2020: FIGO Stage I (cT1, cN0, cM0) - Signed by Sara Ramires MD on 1/14/2020  Histologic grade (G): G1  Histologic grading system: 3 grade system  Stage used in treatment planning: Yes  National guidelines used in treatment planning: Yes             Patient ID: Goldie Stevens is a 47 y o  female  16-year-old returns for postoperative evaluation  She was able to void after removal of her Mancuso catheter last week that was placed for urinary retention  She did present to the emergency department to have a bladder scan performed  This revealed that she was voiding completely  This provided reassurance for her  S further workup for her urinary retention, she had laboratory studies performed which were essentially normal with the exception of a white count of 14 3  CT scan of the abdomen pelvis on 1/14/2020 that I personally reviewed did not reveal any evidence of significant abdominal or pelvic pathology  There is a small amount of pelvic fluid present  No abscess  She is ambulatory  She is having bowel movements  She is afebrile  Her dysuria is improving  She completed a 3 day course of ciprofloxacin  No vaginal bleeding  The following portions of the patient's history were reviewed and updated as appropriate: allergies, current medications, past family history, past medical history, past social history, past surgical history and problem list     Review of Systems      Current Outpatient Medications:     acetaminophen (TYLENOL) 500 mg tablet, Take 500 mg by mouth every 6 (six) hours as needed for mild pain, Disp: , Rfl:     Multiple Vitamins-Calcium (ONE-A-DAY WOMENS FORMULA PO), Take by mouth, Disp: , Rfl:     oxyCODONE (ROXICODONE) 5 mg immediate release tablet, 1 -2 tabs by mouth every 4-6 hour as needed (Patient not taking: Reported on 1/11/2020), Disp: 10 tablet, Rfl: 0    Probiotic Product (PROBIOTIC + TURMERIC EXTRACT) 400 MG CAPS, Take 580 mg by mouth, Disp: , Rfl:     Objective:    Blood pressure 126/80, pulse 90, temperature 99 9 °F (37 7 °C), temperature source Oral, height 5' 3" (1 6 m), weight 46 8 kg (103 lb 3 2 oz), last menstrual period 12/13/2019  Body mass index is 18 28 kg/m²    Body surface area is 1 46 meters squared  Physical Exam   Constitutional: She is oriented to person, place, and time  She appears well-developed and well-nourished  No distress  Pulmonary/Chest: Effort normal    Neurological: She is alert and oriented to person, place, and time  Skin: She is not diaphoretic  Psychiatric: She has a normal mood and affect   Her behavior is normal  Judgment and thought content normal

## 2020-01-16 NOTE — ASSESSMENT & PLAN NOTE
59-year-old with stage IA grade 1 endometrial cancer  There was no lymphovascular space invasion  Mismatch repair was intact  She is recovering well from surgery  Her urinary retention has resolved  CT did not show any evidence of abdominal or pelvic pathology  Her performance status is 0   1  Return in 4 weeks for postoperative examination  2  I discussed that she does not require adjuvant therapy for her early stage endometrial cancer

## 2020-01-27 ENCOUNTER — HOSPITAL ENCOUNTER (OUTPATIENT)
Dept: BONE DENSITY | Facility: IMAGING CENTER | Age: 50
Discharge: HOME | End: 2020-01-27

## 2020-01-27 VITALS — WEIGHT: 100 LBS | BODY MASS INDEX: 17.72 KG/M2 | HEIGHT: 63 IN

## 2020-01-27 DIAGNOSIS — Z12.31 SCREENING MAMMOGRAM FOR HIGH-RISK PATIENT: ICD-10-CM

## 2020-01-27 PROCEDURE — 77067 SCR MAMMO BI INCL CAD: CPT

## 2020-02-12 ENCOUNTER — OFFICE VISIT (OUTPATIENT)
Dept: GYNECOLOGIC ONCOLOGY | Facility: HOSPITAL | Age: 50
End: 2020-02-12

## 2020-02-12 VITALS
DIASTOLIC BLOOD PRESSURE: 68 MMHG | SYSTOLIC BLOOD PRESSURE: 116 MMHG | WEIGHT: 104.2 LBS | HEIGHT: 63 IN | TEMPERATURE: 98.9 F | BODY MASS INDEX: 18.46 KG/M2 | HEART RATE: 68 BPM

## 2020-02-12 DIAGNOSIS — C54.1 ENDOMETRIAL CANCER (HCC): Primary | ICD-10-CM

## 2020-02-12 PROBLEM — N85.02 COMPLEX ATYPICAL ENDOMETRIAL HYPERPLASIA: Status: RESOLVED | Noted: 2019-12-20 | Resolved: 2020-02-12

## 2020-02-12 PROBLEM — R33.8 ACUTE URINARY RETENTION: Status: RESOLVED | Noted: 2020-01-08 | Resolved: 2020-02-12

## 2020-02-12 PROBLEM — R30.0 DYSURIA: Status: RESOLVED | Noted: 2020-01-08 | Resolved: 2020-02-12

## 2020-02-12 PROCEDURE — 99024 POSTOP FOLLOW-UP VISIT: CPT | Performed by: OBSTETRICS & GYNECOLOGY

## 2020-02-12 NOTE — PROGRESS NOTES
Assessment/Plan:    Problem List Items Addressed This Visit        Genitourinary    Endometrial cancer ([facility] [address] ) - Primary     58-year-old with stage IA grade 1 endometrial cancer status post total abdominal hysterectomy, bilateral salpingo-oophorectomy  She had a large fibroid uterus  She is recovering well from surgery  There is delayed healing at the vaginal apex  Performance status is 0   1  I discussed ongoing activity limitations  2  She will follow up with her gynecologist in 6 months and return here in 1 year for endometrial cancer surveillance  CHIEF COMPLAINT:  Postoperative evaluation       Problem:  Cancer Staging  Endometrial cancer [facility])  Staging form: Corpus Uteri - Carcinoma, AJCC 8th Edition  - Clinical stage from 1/14/2020: FIGO Stage I (cT1, cN0, cM0) - Signed by Kelsey Duncan MD on 1/14/2020  - Pathologic stage from 1/14/2020: FIGO Stage IA (pT1a, pN0, cM0) - Signed by Kelsey Duncan MD on 1/14/2020        Previous therapy:     [facility] )    12/27/2019 Surgery     Total abdominal hysterectomy bilateral salpingo-oophorectomy for stage IA grade 1 noninvasive endometrial adenocarcinoma  No further treatment required  Gorman syndrome negative      1/14/2020 Initial Diagnosis     Endometrial cancer ([facility] [address] )      1/14/2020 -  Cancer Staged     Staging form: Corpus Uteri - Carcinoma, AJCC 8th Edition  - Pathologic stage from 1/14/2020: FIGO Stage IA (pT1a, pN0, cM0) - Signed by Kelsey Duncan MD on 1/14/2020  Stage used in treatment planning: Yes  National guidelines used in treatment planning: Yes        1/14/2020 -  Cancer Staged     Staging form: Corpus Uteri - Carcinoma, AJCC 8th Edition  - Clinical stage from 1/14/2020: FIGO Stage I (cT1, cN0, cM0) - Signed by Kelsey Duncan MD on 1/14/2020  Histologic grade (G): G1  Histologic grading system: 3 grade system  Stage used in treatment planning: Yes  National guidelines used in treatment planning:  Yes Patient ID: Shelby Ernst is a 47 y o  female  48-year-old returns for postoperative evaluation  She is back to her normal level of activity  She has no new complaints  The following portions of the patient's history were reviewed and updated as appropriate: allergies, current medications, past family history, past medical history, past social history, past surgical history and problem list     Review of Systems      Current Outpatient Medications:     acetaminophen (TYLENOL) 500 mg tablet, Take 500 mg by mouth every 6 (six) hours as needed for mild pain, Disp: , Rfl:     Multiple Vitamins-Calcium (ONE-A-DAY WOMENS FORMULA PO), Take by mouth, Disp: , Rfl:     oxyCODONE (ROXICODONE) 5 mg immediate release tablet, 1 -2 tabs by mouth every 4-6 hour as needed (Patient not taking: Reported on 1/11/2020), Disp: 10 tablet, Rfl: 0    Probiotic Product (PROBIOTIC + TURMERIC EXTRACT) 400 MG CAPS, Take 580 mg by mouth, Disp: , Rfl:     Objective:    Blood pressure 116/68, pulse 68, temperature 98 9 °F (37 2 °C), temperature source Oral, height 5' 2 5" (1 588 m), weight 47 3 kg (104 lb 3 2 oz), last menstrual period 12/13/2019  Body mass index is 18 75 kg/m²  Body surface area is 1 46 meters squared  Physical Exam   Constitutional: She appears well-developed and well-nourished  Abdominal: Soft  She exhibits no distension  There is no tenderness  There is no rebound and no guarding  Genitourinary:   Genitourinary Comments: Vaginal apex healing well  Mobile  Mild inflammatory changes at the apex     Skin:   Incision is clean, dry, intact

## 2020-02-12 NOTE — LETTER
February 12, 2020     [place]  [id number] [facility] [address]    Patient: Mark Nogueira   YOB: 1970   Date of Visit: 2/12/2020       Dear Dr Master Mathews: Thank you for referring Mark Nogueira to me for evaluation  Below are my notes for this consultation  If you have questions, please do not hesitate to call me  I look forward to following your patient along with you  Sincerely,        Brady Coyle MD        CC: LALITO Degroot MD  2/12/2020  3:53 PM  Sign at close encounter  Assessment/Plan:    Problem List Items Addressed This Visit        Genitourinary    Endometrial cancer [facility]) - Primary     69-year-old with stage IA grade 1 endometrial cancer status post total abdominal hysterectomy, bilateral salpingo-oophorectomy  She had a large fibroid uterus  She is recovering well from surgery  There is delayed healing at the vaginal apex  Performance status is 0   1  I discussed ongoing activity limitations  2  She will follow up with her gynecologist in 6 months and return here in 1 year for endometrial cancer surveillance  CHIEF COMPLAINT:  Postoperative evaluation       Problem:  Cancer Staging  Endometrial cancer [facility])  Staging form: Corpus Uteri - Carcinoma, AJCC 8th Edition  - Clinical stage from 1/14/2020: FIGO Stage I (cT1, cN0, cM0) - Signed by Susan Mitchell MD on 1/14/2020  - Pathologic stage from 1/14/2020: FIGO Stage IA (pT1a, pN0, cM0) - Signed by Susan Mitchell MD on 1/14/2020        Previous therapy:     Endometrial cancer ([facility] [address] )    12/27/2019 Surgery     Total abdominal hysterectomy bilateral salpingo-oophorectomy for stage IA grade 1 noninvasive endometrial adenocarcinoma  No further treatment required    Gorman syndrome negative      1/14/2020 Initial Diagnosis     Endometrial cancer ([facility] [address] )      1/14/2020 -  Cancer Staged     Staging form: Corpus Uteri - Carcinoma, AJCC 8th Edition  - Pathologic stage from 1/14/2020: FIGO Stage IA (pT1a, pN0, cM0) - Signed by Jerri Nagy MD on 1/14/2020  Stage used in treatment planning: Yes  National guidelines used in treatment planning: Yes        1/14/2020 -  Cancer Staged     Staging form: Corpus Uteri - Carcinoma, AJCC 8th Edition  - Clinical stage from 1/14/2020: FIGO Stage I (cT1, cN0, cM0) - Signed by Jerri Nagy MD on 1/14/2020  Histologic grade (G): G1  Histologic grading system: 3 grade system  Stage used in treatment planning: Yes  National guidelines used in treatment planning: Yes             Patient ID: Barry Fitzpatrick is a 47 y o  female  62-year-old returns for postoperative evaluation  She is back to her normal level of activity  She has no new complaints  The following portions of the patient's history were reviewed and updated as appropriate: allergies, current medications, past family history, past medical history, past social history, past surgical history and problem list     Review of Systems      Current Outpatient Medications:     acetaminophen (TYLENOL) 500 mg tablet, Take 500 mg by mouth every 6 (six) hours as needed for mild pain, Disp: , Rfl:     Multiple Vitamins-Calcium (ONE-A-DAY WOMENS FORMULA PO), Take by mouth, Disp: , Rfl:     oxyCODONE (ROXICODONE) 5 mg immediate release tablet, 1 -2 tabs by mouth every 4-6 hour as needed (Patient not taking: Reported on 1/11/2020), Disp: 10 tablet, Rfl: 0    Probiotic Product (PROBIOTIC + TURMERIC EXTRACT) 400 MG CAPS, Take 580 mg by mouth, Disp: , Rfl:     Objective:    Blood pressure 116/68, pulse 68, temperature 98 9 °F (37 2 °C), temperature source Oral, height 5' 2 5" (1 588 m), weight 47 3 kg (104 lb 3 2 oz), last menstrual period 12/13/2019  Body mass index is 18 75 kg/m²  Body surface area is 1 46 meters squared  Physical Exam   Constitutional: She appears well-developed and well-nourished  Abdominal: Soft  She exhibits no distension  There is no tenderness   There is no rebound and no guarding  Genitourinary:   Genitourinary Comments: Vaginal apex healing well  Mobile  Mild inflammatory changes at the apex     Skin:   Incision is clean, dry, intact

## 2020-02-12 NOTE — ASSESSMENT & PLAN NOTE
26-year-old with stage IA grade 1 endometrial cancer status post total abdominal hysterectomy, bilateral salpingo-oophorectomy  She had a large fibroid uterus  She is recovering well from surgery  There is delayed healing at the vaginal apex  Performance status is 0   1  I discussed ongoing activity limitations  2  She will follow up with her gynecologist in 6 months and return here in 1 year for endometrial cancer surveillance

## 2021-02-15 ENCOUNTER — TELEPHONE (OUTPATIENT)
Dept: GYNECOLOGIC ONCOLOGY | Facility: CLINIC | Age: 51
End: 2021-02-15

## 2021-02-17 ENCOUNTER — OFFICE VISIT (OUTPATIENT)
Dept: GYNECOLOGIC ONCOLOGY | Facility: HOSPITAL | Age: 51
End: 2021-02-17

## 2021-02-17 VITALS
BODY MASS INDEX: 21.16 KG/M2 | TEMPERATURE: 98.6 F | WEIGHT: 119.4 LBS | DIASTOLIC BLOOD PRESSURE: 80 MMHG | RESPIRATION RATE: 17 BRPM | SYSTOLIC BLOOD PRESSURE: 142 MMHG | HEIGHT: 63 IN | HEART RATE: 78 BPM

## 2021-02-17 DIAGNOSIS — N94.10 DYSPAREUNIA IN FEMALE: ICD-10-CM

## 2021-02-17 DIAGNOSIS — Z85.42 HISTORY OF ENDOMETRIAL CANCER: Primary | ICD-10-CM

## 2021-02-17 PROCEDURE — 99214 OFFICE O/P EST MOD 30 MIN: CPT | Performed by: OBSTETRICS & GYNECOLOGY

## 2021-02-17 NOTE — ASSESSMENT & PLAN NOTE
51yo with stage IA, grade 1 endometrial cancer , dyspareunia  She is clinically without evidence of disease recurrence  Performance status is 0   1  I discussed interventions for dyspareunia including lubrication, altering positions, dilators , physical therapy  She would prefer to hold off on pelvic physiotherapy based on concerns of out of pocket costs  2   She will follow-up in 6 months with her gynecologist and return here in 1 year for endometrial cancer surveillance

## 2021-02-17 NOTE — LETTER
February 17, 2021     Miguel Kendall DO  [address] [facility] 1    Patient: Yonatan Gates   YOB: 1970   Date of Visit: 2/17/2021       Dear Dr Cristina Gaffney: Thank you for referring Yonatan Gates to me for evaluation  Below are my notes for this consultation  If you have questions, please do not hesitate to call me  I look forward to following your patient along with you  Sincerely,        Taco Nelson MD        CC: LALITO Domingo MD  2/17/2021  1:28 PM  Incomplete  Assessment/Plan:    Problem List Items Addressed This Visit        Other    History of endometrial cancer - Primary     51yo with stage IA, grade 1 endometrial cancer , dyspareunia  She is clinically without evidence of disease recurrence  Performance status is 0   1  I discussed interventions for dyspareunia including lubrication, altering positions, dilators , physical therapy  She would prefer to hold off on pelvic physiotherapy based on concerns of out of pocket costs  2   She will follow-up in 6 months with her gynecologist and return here in 1 year for endometrial cancer surveillance  Dyspareunia in female            CHIEF COMPLAINT:   Endometrial cancer surveillance, dyspareunia      Problem:  Cancer Staging  History of endometrial cancer  Staging form: Corpus Uteri - Carcinoma, AJCC 8th Edition  - Clinical stage from 1/14/2020: FIGO Stage I (cT1, cN0, cM0) - Signed by Rigo Silver MD on 1/14/2020  - Pathologic stage from 1/14/2020: FIGO Stage IA (pT1a, pN0, cM0) - Signed by Rigo Silver MD on 1/14/2020        Previous therapy:  Oncology History   History of endometrial cancer   12/27/2019 Surgery    Total abdominal hysterectomy bilateral salpingo-oophorectomy for stage IA grade 1 noninvasive endometrial adenocarcinoma  No further treatment required    Gorman syndrome negative     1/14/2020 Initial Diagnosis    Endometrial cancer ([facility] [address] )     1/14/2020 -  Cancer Staged Staging form: Corpus Uteri - Carcinoma, AJCC 8th Edition  - Pathologic stage from 1/14/2020: FIGO Stage IA (pT1a, pN0, cM0) - Signed by Mihai Conroy MD on 1/14/2020  Stage used in treatment planning: Yes  National guidelines used in treatment planning: Yes       1/14/2020 -  Cancer Staged    Staging form: Corpus Uteri - Carcinoma, AJCC 8th Edition  - Clinical stage from 1/14/2020: FIGO Stage I (cT1, cN0, cM0) - Signed by Mihai Conroy MD on 1/14/2020  Histologic grade (G): G1  Histologic grading system: 3 grade system  Stage used in treatment planning: Yes  National guidelines used in treatment planning: Yes             Patient ID: Kris Gomes is a 45 y o  female   67-year-old presents for endometrial cancer surveillance  She has no complaints other than dyspareunia  She had dyspareunia prior to surgery which she felt was secondary to fibroids  Is mainly insertional dyspareunia  She has no vaginal bleeding or pelvic pain  No other interval change in her medications or medical history since her last visit  The following portions of the patient's history were reviewed and updated as appropriate: allergies, current medications, past family history, past medical history, past social history, past surgical history and problem list     Review of Systems   Constitutional: Negative for activity change and unexpected weight change  HENT: Negative  Eyes: Negative  Respiratory: Negative  Cardiovascular: Negative  Gastrointestinal: Negative for abdominal distention and abdominal pain  Endocrine: Negative  Genitourinary: Positive for dyspareunia  Negative for pelvic pain and vaginal bleeding  Musculoskeletal: Negative  Skin: Negative  Allergic/Immunologic: Negative  Neurological: Negative  Hematological: Negative  Psychiatric/Behavioral: Negative          Current Outpatient Medications   Medication Sig Dispense Refill    acetaminophen (TYLENOL) 500 mg tablet Take 500 mg by mouth every 6 (six) hours as needed for mild pain      Multiple Vitamins-Calcium (ONE-A-DAY WOMENS FORMULA PO) Take by mouth      Probiotic Product (PROBIOTIC + TURMERIC EXTRACT) 400 MG CAPS Take 580 mg by mouth      oxyCODONE (ROXICODONE) 5 mg immediate release tablet 1 -2 tabs by mouth every 4-6 hour as needed (Patient not taking: Reported on 1/11/2020) 10 tablet 0     No current facility-administered medications for this visit  Objective:    Blood pressure 142/80, pulse 78, temperature 98 6 °F (37 °C), temperature source Temporal, resp  rate 17, height 5' 2 5" (1 588 m), weight 54 2 kg (119 lb 6 4 oz), last menstrual period 12/13/2019  Body mass index is 21 49 kg/m²  Body surface area is 1 54 meters squared  Physical Exam  Vitals signs reviewed  Exam conducted with a chaperone present  Constitutional:       General: She is not in acute distress  Appearance: Normal appearance  She is well-developed and normal weight  She is not ill-appearing, toxic-appearing or diaphoretic  HENT:      Head: Normocephalic and atraumatic  Eyes:      General: No scleral icterus  Right eye: No discharge  Left eye: No discharge  Extraocular Movements: Extraocular movements intact  Conjunctiva/sclera: Conjunctivae normal    Neck:      Musculoskeletal: Normal range of motion and neck supple  Thyroid: No thyromegaly  Pulmonary:      Effort: Pulmonary effort is normal  No respiratory distress  Breath sounds: No stridor  No wheezing  Abdominal:      General: There is no distension  Palpations: Abdomen is soft  There is no mass  Tenderness: There is no abdominal tenderness  There is no guarding or rebound  Hernia: No hernia is present  Genitourinary:     Comments: The external female genitalia is normal  The bartholin's, uretheral and skenes glands are normal  The urethral meatus is normal (midline with no lesions)  Anus without fissure or lesion   Speculum exam reveals a grossly normal vagina  No masses, lesions,discharge or bleeding  No significant cystocele or rectocele noted  Bimanual exam notes a surgical absent cervix, uterus and adnexal structures  No masses or fullness  Bladder is without fullness, mass or tenderness  Musculoskeletal:         General: No swelling, tenderness, deformity or signs of injury  Right lower leg: No edema  Left lower leg: No edema  Lymphadenopathy:      Cervical: No cervical adenopathy  Skin:     General: Skin is warm and dry  Coloration: Skin is not jaundiced or pale  Findings: No bruising or erythema  Neurological:      General: No focal deficit present  Mental Status: She is alert and oriented to person, place, and time  Cranial Nerves: No cranial nerve deficit  Motor: No weakness  Gait: Gait normal    Psychiatric:         Mood and Affect: Mood normal          Behavior: Behavior normal          Thought Content:  Thought content normal          Judgment: Judgment normal          No results found for:   Lab Results   Component Value Date    K 4 4 01/08/2020     01/08/2020    CO2 27 01/08/2020    BUN 13 01/08/2020    CREATININE 0 76 01/08/2020    GLUF 181 (H) 12/20/2019    CALCIUM 9 3 01/08/2020    AST 10 01/08/2020    ALT 23 01/08/2020    ALKPHOS 56 01/08/2020    EGFR 92 01/08/2020     Lab Results   Component Value Date    WBC 14 35 (H) 01/08/2020    HGB 12 7 01/08/2020    HCT 40 1 01/08/2020    MCV 91 01/08/2020     01/08/2020     Lab Results   Component Value Date    NEUTROABS 11 83 (H) 01/08/2020

## 2021-02-17 NOTE — PROGRESS NOTES
Assessment/Plan:    Problem List Items Addressed This Visit        Other    History of endometrial cancer - Primary     53yo with stage IA, grade 1 endometrial cancer , dyspareunia  She is clinically without evidence of disease recurrence  Performance status is 0   1  I discussed interventions for dyspareunia including lubrication, altering positions, dilators , physical therapy  She would prefer to hold off on pelvic physiotherapy based on concerns of out of pocket costs  2   She will follow-up in 6 months with her gynecologist and return here in 1 year for endometrial cancer surveillance  Dyspareunia in female            CHIEF COMPLAINT:   Endometrial cancer surveillance, dyspareunia      Problem:  Cancer Staging  History of endometrial cancer  Staging form: Corpus Uteri - Carcinoma, AJCC 8th Edition  - Clinical stage from 1/14/2020: FIGO Stage I (cT1, cN0, cM0) - Signed by Kelsey Duncan MD on 1/14/2020  - Pathologic stage from 1/14/2020: FIGO Stage IA (pT1a, pN0, cM0) - Signed by Kelsey Duncan MD on 1/14/2020        Previous therapy:  Oncology History   History of endometrial cancer   12/27/2019 Surgery    Total abdominal hysterectomy bilateral salpingo-oophorectomy for stage IA grade 1 noninvasive endometrial adenocarcinoma  No further treatment required    Gorman syndrome negative     1/14/2020 Initial Diagnosis    Endometrial cancer ([facility] [address] )     1/14/2020 -  Cancer Staged    Staging form: Corpus Uteri - Carcinoma, AJCC 8th Edition  - Pathologic stage from 1/14/2020: FIGO Stage IA (pT1a, pN0, cM0) - Signed by Kelsey Duncan MD on 1/14/2020  Stage used in treatment planning: Yes  National guidelines used in treatment planning: Yes       1/14/2020 -  Cancer Staged    Staging form: Corpus Uteri - Carcinoma, AJCC 8th Edition  - Clinical stage from 1/14/2020: FIGO Stage I (cT1, cN0, cM0) - Signed by Kelsey Duncan MD on 1/14/2020  Histologic grade (G): G1  Histologic grading system: 3 grade system  Stage used in treatment planning: Yes  National guidelines used in treatment planning: Yes             Patient ID: Yonatan Gates is a 45 y o  female   50-year-old presents for endometrial cancer surveillance  She has no complaints other than dyspareunia  She had dyspareunia prior to surgery which she felt was secondary to fibroids  Is mainly insertional dyspareunia  She has no vaginal bleeding or pelvic pain  No other interval change in her medications or medical history since her last visit  The following portions of the patient's history were reviewed and updated as appropriate: allergies, current medications, past family history, past medical history, past social history, past surgical history and problem list     Review of Systems   Constitutional: Negative for activity change and unexpected weight change  HENT: Negative  Eyes: Negative  Respiratory: Negative  Cardiovascular: Negative  Gastrointestinal: Negative for abdominal distention and abdominal pain  Endocrine: Negative  Genitourinary: Positive for dyspareunia  Negative for pelvic pain and vaginal bleeding  Musculoskeletal: Negative  Skin: Negative  Allergic/Immunologic: Negative  Neurological: Negative  Hematological: Negative  Psychiatric/Behavioral: Negative  Current Outpatient Medications   Medication Sig Dispense Refill    acetaminophen (TYLENOL) 500 mg tablet Take 500 mg by mouth every 6 (six) hours as needed for mild pain      Multiple Vitamins-Calcium (ONE-A-DAY WOMENS FORMULA PO) Take by mouth      Probiotic Product (PROBIOTIC + TURMERIC EXTRACT) 400 MG CAPS Take 580 mg by mouth      oxyCODONE (ROXICODONE) 5 mg immediate release tablet 1 -2 tabs by mouth every 4-6 hour as needed (Patient not taking: Reported on 1/11/2020) 10 tablet 0     No current facility-administered medications for this visit              Objective:    Blood pressure 142/80, pulse 78, temperature 98 6 °F (37 °C), temperature source Temporal, resp  rate 17, height 5' 2 5" (1 588 m), weight 54 2 kg (119 lb 6 4 oz), last menstrual period 12/13/2019  Body mass index is 21 49 kg/m²  Body surface area is 1 54 meters squared  Physical Exam  Vitals signs reviewed  Exam conducted with a chaperone present  Constitutional:       General: She is not in acute distress  Appearance: Normal appearance  She is well-developed and normal weight  She is not ill-appearing, toxic-appearing or diaphoretic  HENT:      Head: Normocephalic and atraumatic  Eyes:      General: No scleral icterus  Right eye: No discharge  Left eye: No discharge  Extraocular Movements: Extraocular movements intact  Conjunctiva/sclera: Conjunctivae normal    Neck:      Musculoskeletal: Normal range of motion and neck supple  Thyroid: No thyromegaly  Pulmonary:      Effort: Pulmonary effort is normal  No respiratory distress  Breath sounds: No stridor  No wheezing  Abdominal:      General: There is no distension  Palpations: Abdomen is soft  There is no mass  Tenderness: There is no abdominal tenderness  There is no guarding or rebound  Hernia: No hernia is present  Genitourinary:     Comments: The external female genitalia is normal  The bartholin's, uretheral and skenes glands are normal  The urethral meatus is normal (midline with no lesions)  Anus without fissure or lesion  Speculum exam reveals a grossly normal vagina  No masses, lesions,discharge or bleeding  No significant cystocele or rectocele noted  Bimanual exam notes a surgical absent cervix, uterus and adnexal structures  No masses or fullness  Bladder is without fullness, mass or tenderness  Musculoskeletal:         General: No swelling, tenderness, deformity or signs of injury  Right lower leg: No edema  Left lower leg: No edema  Lymphadenopathy:      Cervical: No cervical adenopathy     Skin:     General: Skin is warm and dry       Coloration: Skin is not jaundiced or pale  Findings: No bruising or erythema  Neurological:      General: No focal deficit present  Mental Status: She is alert and oriented to person, place, and time  Cranial Nerves: No cranial nerve deficit  Motor: No weakness  Gait: Gait normal    Psychiatric:         Mood and Affect: Mood normal          Behavior: Behavior normal          Thought Content:  Thought content normal          Judgment: Judgment normal          No results found for:   Lab Results   Component Value Date    K 4 4 01/08/2020     01/08/2020    CO2 27 01/08/2020    BUN 13 01/08/2020    CREATININE 0 76 01/08/2020    GLUF 181 (H) 12/20/2019    CALCIUM 9 3 01/08/2020    AST 10 01/08/2020    ALT 23 01/08/2020    ALKPHOS 56 01/08/2020    EGFR 92 01/08/2020     Lab Results   Component Value Date    WBC 14 35 (H) 01/08/2020    HGB 12 7 01/08/2020    HCT 40 1 01/08/2020    MCV 91 01/08/2020     01/08/2020     Lab Results   Component Value Date    NEUTROABS 11 83 (H) 01/08/2020

## 2021-03-04 ENCOUNTER — TRANSCRIBE ORDERS (OUTPATIENT)
Dept: ADMINISTRATIVE | Facility: HOSPITAL | Age: 51
End: 2021-03-04

## 2021-03-04 DIAGNOSIS — Z12.31 ENCOUNTER FOR SCREENING MAMMOGRAM FOR MALIGNANT NEOPLASM OF BREAST: Primary | ICD-10-CM

## 2021-05-10 ENCOUNTER — HOSPITAL ENCOUNTER (OUTPATIENT)
Dept: MAMMOGRAPHY | Facility: IMAGING CENTER | Age: 51
Discharge: HOME | End: 2021-05-10
Payer: COMMERCIAL

## 2021-05-10 VITALS — WEIGHT: 120 LBS | HEIGHT: 63 IN | BODY MASS INDEX: 21.26 KG/M2

## 2021-05-10 DIAGNOSIS — Z12.31 ENCOUNTER FOR SCREENING MAMMOGRAM FOR MALIGNANT NEOPLASM OF BREAST: ICD-10-CM

## 2021-05-10 PROCEDURE — 77067 SCR MAMMO BI INCL CAD: CPT

## 2021-05-10 PROCEDURE — 77063 BREAST TOMOSYNTHESIS BI: CPT

## 2021-09-01 ENCOUNTER — VBI (OUTPATIENT)
Dept: ADMINISTRATIVE | Facility: OTHER | Age: 51
End: 2021-09-01

## 2021-09-01 NOTE — TELEPHONE ENCOUNTER
Upon review of the documents received only cervical cancer screening HM updated to 5yr recall  Doc was previously updated    Any additional questions or concerns should be emailed to the Practice Liaisons via [email or web address] email, please do not reply via In Basket      Thank you  Willem Abarca

## 2021-09-07 ENCOUNTER — ANNUAL EXAM (OUTPATIENT)
Dept: OBGYN CLINIC | Facility: CLINIC | Age: 51
End: 2021-09-07

## 2021-09-07 VITALS
SYSTOLIC BLOOD PRESSURE: 120 MMHG | HEIGHT: 63 IN | BODY MASS INDEX: 21.97 KG/M2 | WEIGHT: 124 LBS | DIASTOLIC BLOOD PRESSURE: 80 MMHG

## 2021-09-07 DIAGNOSIS — Z90.710 STATUS POST TOTAL ABDOMINAL HYSTERECTOMY AND BILATERAL SALPINGO-OOPHORECTOMY (TAH-BSO): ICD-10-CM

## 2021-09-07 DIAGNOSIS — Z90.79 STATUS POST TOTAL ABDOMINAL HYSTERECTOMY AND BILATERAL SALPINGO-OOPHORECTOMY (TAH-BSO): ICD-10-CM

## 2021-09-07 DIAGNOSIS — Z01.419 ENCOUNTER FOR GYNECOLOGICAL EXAMINATION WITHOUT ABNORMAL FINDING: Primary | ICD-10-CM

## 2021-09-07 DIAGNOSIS — Z12.31 ENCOUNTER FOR SCREENING MAMMOGRAM FOR MALIGNANT NEOPLASM OF BREAST: ICD-10-CM

## 2021-09-07 DIAGNOSIS — Z90.722 STATUS POST TOTAL ABDOMINAL HYSTERECTOMY AND BILATERAL SALPINGO-OOPHORECTOMY (TAH-BSO): ICD-10-CM

## 2021-09-07 DIAGNOSIS — Z85.42 HISTORY OF ENDOMETRIAL CANCER: ICD-10-CM

## 2021-09-07 PROCEDURE — 99396 PREV VISIT EST AGE 40-64: CPT | Performed by: NURSE PRACTITIONER

## 2021-09-07 NOTE — PATIENT INSTRUCTIONS
Calcium 1200-1500mg + 600-1000 IU Vit D daily  Exercise 150 minutes per week minimum including weight bearing exercises  Annual mammogram and monthly breast self exam recommended  Colonoscopy- need to get Rec [address] [place] [address],[facility] Phone number provided   Silicone based lubricant or coconut oil with sex as needed

## 2021-09-07 NOTE — PROGRESS NOTES
Assessment/Plan:  Calcium 1200-1500mg + 600-1000 IU Vit D daily  Exercise 150 minutes per week minimum including weight bearing exercises  Annual mammogram and monthly breast self exam recommended  Colonoscopy- need to get Rec [address] [place] [address] Phone number provided   Silicone based lubricant or coconut oil with sex as needed  Diagnoses and all orders for this visit:    Encounter for gynecological examination without abnormal finding    Encounter for screening mammogram for malignant neoplasm of breast  -     Mammo screening bilateral w 3d & cad; Future    Status post total abdominal hysterectomy and bilateral salpingo-oophorectomy ([facility])    History of endometrial cancer  Comments:  Stage 1A grade 1 noninvasive , No evidence of recurrence of disease on exam           Subjective:      Patient ID: Bernard Gonzalez is a 45 y o  female  Here for annual gyn S/p hysterectomy 1/2020 w/ Dr Tam for  Stage 1A grade 1 noninvasive endometrial cancer We are alternating visits on a 6 month basis with Dr Tam Last seen in his office 2/2021 at that time c/o dyspareunia  Some Hot flashes  Using lubricant Some days worse than others  Working through it  Denies vaginal bleeding  Denies abdominal or pelvic pain  Bowel and bladder are normal  Has not yet had Colonoscopy  Mammoo due  The following portions of the patient's history were reviewed and updated as appropriate: allergies, current medications, past family history, past medical history, past social history, past surgical history and problem list     Review of Systems   Constitutional: Negative for fatigue and unexpected weight change  Gastrointestinal: Negative for abdominal distention, abdominal pain, constipation and diarrhea  Genitourinary: Negative for difficulty urinating, dyspareunia, dysuria, frequency, genital sores, pelvic pain, urgency, vaginal bleeding, vaginal discharge and vaginal pain  Neurological: Negative for headaches  Psychiatric/Behavioral: Negative  Negative for dysphoric mood  The patient is not nervous/anxious  Objective:      /80 (BP Location: Left arm, Patient Position: Sitting, Cuff Size: Standard)   Ht 5' 3" (1 6 m)   Wt 56 2 kg (124 lb)   LMP 12/13/2019 (Exact Date)   BMI 21 97 kg/m²          Physical Exam  Vitals and nursing note reviewed  Constitutional:       General: She is not in acute distress  Appearance: Normal appearance  HENT:      Head: Normocephalic and atraumatic  Pulmonary:      Effort: Pulmonary effort is normal    Chest:      Breasts: Breasts are symmetrical          Right: Normal  No mass, nipple discharge, skin change or tenderness  Left: Normal  No mass, nipple discharge, skin change or tenderness  Abdominal:      General: There is no distension  Palpations: Abdomen is soft  Tenderness: There is no abdominal tenderness  There is no guarding or rebound  Genitourinary:     General: Normal vulva  Exam position: Lithotomy position  Labia:         Right: No rash, tenderness, lesion or injury  Left: No rash, tenderness, lesion or injury  Urethra: No prolapse, urethral pain, urethral swelling or urethral lesion  Vagina: Normal  No vaginal discharge, erythema or lesions  Uterus: Absent  Adnexa:         Right: No mass or tenderness  Left: No mass or tenderness  Rectum: Normal       Comments: Vagina is without lesion  Vaginal cuff is intact  No thickening or nodularity noted at the vaginal cuff  No masses on bimanual exam  R/V exam limited due to large amount of stool in rectum  Musculoskeletal:         General: Normal range of motion  Lymphadenopathy:      Upper Body:      Right upper body: No axillary adenopathy  Left upper body: No axillary adenopathy  Lower Body: No right inguinal adenopathy  No left inguinal adenopathy  Skin:     General: Skin is warm and dry     Neurological: Mental Status: She is alert and oriented to person, place, and time  Psychiatric:         Mood and Affect: Mood normal          Behavior: Behavior normal          Thought Content:  Thought content normal          Judgment: Judgment normal

## 2021-12-29 ENCOUNTER — TELEPHONE (OUTPATIENT)
Dept: GYNECOLOGIC ONCOLOGY | Facility: CLINIC | Age: 51
End: 2021-12-29

## 2022-02-24 ENCOUNTER — OFFICE VISIT (OUTPATIENT)
Dept: GYNECOLOGIC ONCOLOGY | Facility: HOSPITAL | Age: 52
End: 2022-02-24

## 2022-02-24 VITALS
WEIGHT: 124.5 LBS | BODY MASS INDEX: 22.06 KG/M2 | SYSTOLIC BLOOD PRESSURE: 124 MMHG | HEART RATE: 72 BPM | HEIGHT: 63 IN | OXYGEN SATURATION: 100 % | DIASTOLIC BLOOD PRESSURE: 72 MMHG | TEMPERATURE: 99.1 F | RESPIRATION RATE: 17 BRPM

## 2022-02-24 DIAGNOSIS — Z12.11 COLON CANCER SCREENING: ICD-10-CM

## 2022-02-24 DIAGNOSIS — Z85.42 HISTORY OF ENDOMETRIAL CANCER: Primary | ICD-10-CM

## 2022-02-24 PROCEDURE — 99212 OFFICE O/P EST SF 10 MIN: CPT | Performed by: OBSTETRICS & GYNECOLOGY

## 2022-02-24 NOTE — PROGRESS NOTES
Assessment/Plan:    Problem List Items Addressed This Visit        Other    History of endometrial cancer - Primary     50-year-old with a history of stage I A grade 1 endometrial cancer  She is clinically without evidence of disease recurrence  She is due for colon cancer screening  Her performance status is 0   1  Cologuard for colon cancer screening   2  Return in 1 year for endometrial cancer surveillance  She will follow up with her gynecologist in 6 months  Colon cancer screening    Relevant Orders    Cologuard            CHIEF COMPLAINT:  Endometrial cancer surveillance      Problem:  Cancer Staging  History of endometrial cancer  Staging form: Corpus Uteri - Carcinoma, AJCC 8th Edition  - Clinical stage from 1/14/2020: FIGO Stage I (cT1, cN0, cM0) - Signed by Dayton Solano MD on 1/14/2020  - Pathologic stage from 1/14/2020: FIGO Stage IA (pT1a, pN0, cM0) - Signed by Dayton Solano MD on 1/14/2020        Previous therapy:  Oncology History   History of endometrial cancer   12/27/2019 Surgery    Total abdominal hysterectomy bilateral salpingo-oophorectomy for stage IA grade 1 noninvasive endometrial adenocarcinoma  No further treatment required  Gorman syndrome negative     1/14/2020 Initial Diagnosis    Endometrial cancer ([facility] [address] )     1/14/2020 -  Cancer Staged    Staging form: Corpus Uteri - Carcinoma, AJCC 8th Edition  - Pathologic stage from 1/14/2020: FIGO Stage IA (pT1a, pN0, cM0) - Signed by Dayton Solano MD on 1/14/2020  Stage used in treatment planning: Yes  National guidelines used in treatment planning: Yes       1/14/2020 -  Cancer Staged    Staging form: Corpus Uteri - Carcinoma, AJCC 8th Edition  - Clinical stage from 1/14/2020: FIGO Stage I (cT1, cN0, cM0) - Signed by Dayton Solano MD on 1/14/2020  Histologic grade (G): G1  Histologic grading system: 3 grade system  Stage used in treatment planning: Yes  National guidelines used in treatment planning:  Yes Patient ID: Randal Robles is a 45 y o  female  who has no new complaints today  No vaginal bleeding, abdominal/pelvic pain  Normal bowel and bladder function  No interval change in medical history since last visit  Quality of life is good  She is due for colon cancer screening  The following portions of the patient's history were reviewed and updated as appropriate: allergies, current medications, past family history, past medical history, past social history, past surgical history and problem list     Review of Systems   Constitutional: Negative for activity change and unexpected weight change  HENT: Negative  Eyes: Negative  Respiratory: Negative  Cardiovascular: Negative  Gastrointestinal: Negative for abdominal distention and abdominal pain  Endocrine: Negative  Genitourinary: Negative for pelvic pain and vaginal bleeding  Musculoskeletal: Negative  Skin: Negative  Allergic/Immunologic: Negative  Neurological: Negative  Hematological: Negative  Psychiatric/Behavioral: Negative  Current Outpatient Medications   Medication Sig Dispense Refill    acetaminophen (TYLENOL) 500 mg tablet Take 500 mg by mouth every 6 (six) hours as needed for mild pain      Multiple Vitamins-Calcium (ONE-A-DAY WOMENS FORMULA PO) Take by mouth      Probiotic Product (PROBIOTIC + TURMERIC EXTRACT) 400 MG CAPS Take 580 mg by mouth       No current facility-administered medications for this visit  Objective:    Blood pressure 124/72, pulse 72, temperature 99 1 °F (37 3 °C), temperature source Probe, resp  rate 17, height 5' 3" (1 6 m), weight 56 5 kg (124 lb 8 oz), last menstrual period 12/13/2019, SpO2 100 %  Body mass index is 22 05 kg/m²  Body surface area is 1 58 meters squared  Physical Exam  Vitals reviewed  Exam conducted with a chaperone present  Constitutional:       General: She is not in acute distress  Appearance: Normal appearance   She is well-developed and normal weight  She is not ill-appearing, toxic-appearing or diaphoretic  HENT:      Head: Normocephalic and atraumatic  Eyes:      General: No scleral icterus  Right eye: No discharge  Left eye: No discharge  Extraocular Movements: Extraocular movements intact  Conjunctiva/sclera: Conjunctivae normal    Neck:      Thyroid: No thyromegaly  Pulmonary:      Effort: Pulmonary effort is normal    Abdominal:      General: There is no distension  Palpations: Abdomen is soft  There is no mass  Tenderness: There is no abdominal tenderness  There is no guarding or rebound  Hernia: No hernia is present  Genitourinary:     Comments: The external female genitalia is normal  The bartholin's, uretheral and skenes glands are normal  The urethral meatus is normal (midline with no lesions)  Anus without fissure or lesion  Speculum exam reveals a grossly normal vagina  No masses, lesions,discharge or bleeding  No significant cystocele or rectocele noted  Bimanual exam notes a surgical absent cervix, uterus and adnexal structures  No masses or fullness  Bladder is without fullness, mass or tenderness  Musculoskeletal:      Cervical back: Normal range of motion and neck supple  Right lower leg: No edema  Left lower leg: No edema  Lymphadenopathy:      Cervical: No cervical adenopathy  Skin:     General: Skin is warm and dry  Coloration: Skin is not jaundiced or pale  Findings: No bruising, erythema, lesion or rash  Neurological:      General: No focal deficit present  Mental Status: She is alert and oriented to person, place, and time  Mental status is at baseline  Cranial Nerves: No cranial nerve deficit  Motor: No weakness  Gait: Gait normal    Psychiatric:         Mood and Affect: Mood normal          Behavior: Behavior normal          Thought Content:  Thought content normal          Judgment: Judgment normal

## 2022-02-24 NOTE — ASSESSMENT & PLAN NOTE
58-year-old with a history of stage I A grade 1 endometrial cancer  She is clinically without evidence of disease recurrence  She is due for colon cancer screening  Her performance status is 0   1  Cologuard for colon cancer screening   2  Return in 1 year for endometrial cancer surveillance  She will follow up with her gynecologist in 6 months

## 2022-09-20 ENCOUNTER — ANNUAL EXAM (OUTPATIENT)
Dept: OBGYN CLINIC | Facility: CLINIC | Age: 52
End: 2022-09-20

## 2022-09-20 VITALS
WEIGHT: 119 LBS | SYSTOLIC BLOOD PRESSURE: 144 MMHG | DIASTOLIC BLOOD PRESSURE: 90 MMHG | HEIGHT: 62 IN | BODY MASS INDEX: 21.9 KG/M2

## 2022-09-20 DIAGNOSIS — Z01.419 ENCOUNTER FOR GYNECOLOGICAL EXAMINATION WITHOUT ABNORMAL FINDING: Primary | ICD-10-CM

## 2022-09-20 DIAGNOSIS — Z90.79 STATUS POST TOTAL ABDOMINAL HYSTERECTOMY AND BILATERAL SALPINGO-OOPHORECTOMY (TAH-BSO): ICD-10-CM

## 2022-09-20 DIAGNOSIS — Z90.710 STATUS POST TOTAL ABDOMINAL HYSTERECTOMY AND BILATERAL SALPINGO-OOPHORECTOMY (TAH-BSO): ICD-10-CM

## 2022-09-20 DIAGNOSIS — Z12.31 ENCOUNTER FOR SCREENING MAMMOGRAM FOR MALIGNANT NEOPLASM OF BREAST: ICD-10-CM

## 2022-09-20 DIAGNOSIS — Z85.42 HISTORY OF ENDOMETRIAL CANCER: ICD-10-CM

## 2022-09-20 DIAGNOSIS — Z90.722 STATUS POST TOTAL ABDOMINAL HYSTERECTOMY AND BILATERAL SALPINGO-OOPHORECTOMY (TAH-BSO): ICD-10-CM

## 2022-09-20 PROCEDURE — 99396 PREV VISIT EST AGE 40-64: CPT | Performed by: NURSE PRACTITIONER

## 2022-09-20 NOTE — PATIENT INSTRUCTIONS
Calcium 1200-1500mg + 600-1000 IU Vit D daily  Exercise including weight bearing exercises  Annual mammogram and monthly breast self exam recommended  Colonoscopy-          Ovidio Lav 20 times twice daily  Silicone based lubricant with sex  Vaginal moisturizers twice weekly as needed

## 2022-09-20 NOTE — PROGRESS NOTES
Assessment/Plan:         Diagnoses and all orders for this visit:    Encounter for gynecological examination without abnormal finding    Encounter for screening mammogram for malignant neoplasm of breast  -     Mammo screening bilateral w 3d & cad; Future    Status post total abdominal hysterectomy and bilateral salpingo-oophorectomy ([facility])    History of endometrial cancer Stage 1a noninvasive Grade 1 DX 1/2020  Comments:  [facility]          Subjective:      Patient ID: Barrera Wilson is a 47 y o  female  Here for annual gyn S/p hysterectomy/BSO 1/2020 w/ Dr Fifi Cao  for  Stage 1A grade 1 noninvasive endometrial cancer We are alternating visits on a 6 month basis with Dr Yasmin Atkinson Last seen in his office 2/2022  at that time c/o dyspareunia  Some Hot flashes  Using lubricant Some days worse than others Deniesw abdominal or pelvic pain, discomfort or bloating Bowel and bladder are normal No unusual discharge Over due for mammogram Has not yet had colonoscopy to do cologuard  Moved unexpectedly in May Moved in With her 80year old father Has 12 yo adopted son and has been an adjustment He is in   The following portions of the patient's history were reviewed and updated as appropriate: allergies, current medications, past family history, past medical history, past social history, past surgical history and problem list     Review of Systems   Constitutional: Negative for fatigue and unexpected weight change  Gastrointestinal: Negative for abdominal distention, abdominal pain, constipation and diarrhea  Genitourinary: Negative for difficulty urinating, dyspareunia, dysuria, frequency, genital sores, menstrual problem, pelvic pain, urgency, vaginal bleeding, vaginal discharge and vaginal pain  Neurological: Negative for headaches  Psychiatric/Behavioral: Negative  Negative for dysphoric mood  The patient is not nervous/anxious            Objective:      /90   Ht 5' 2" (1 575 m)   Wt 54 kg (119 lb) LMP 12/13/2019 (Exact Date)   BMI 21 77 kg/m²          Physical Exam  Vitals and nursing note reviewed  Constitutional:       General: She is not in acute distress  Appearance: Normal appearance  HENT:      Head: Normocephalic and atraumatic  Pulmonary:      Effort: Pulmonary effort is normal    Chest:   Breasts: Breasts are symmetrical       Right: Normal  No mass, nipple discharge, skin change, tenderness or axillary adenopathy  Left: Normal  No mass, nipple discharge, skin change, tenderness or axillary adenopathy  Abdominal:      General: There is no distension  Palpations: Abdomen is soft  Tenderness: There is no abdominal tenderness  There is no guarding or rebound  Genitourinary:     General: Normal vulva  Exam position: Lithotomy position  Labia:         Right: No rash, tenderness, lesion or injury  Left: No rash, tenderness, lesion or injury  Urethra: No prolapse, urethral pain, urethral swelling or urethral lesion  Vagina: Normal  No vaginal discharge, erythema or lesions  Uterus: Absent  Adnexa: Right adnexa normal and left adnexa normal         Right: No mass or tenderness  Left: No mass or tenderness  Rectum: Normal       Comments: Vagina is without lesion Vaginal cuff is intact No thickening or nodularity at the vaginal cuff  No masses on bimanual exam  Rectovagianl exam confirmatory for no evidence of disease   Musculoskeletal:         General: Normal range of motion  Lymphadenopathy:      Upper Body:      Right upper body: No axillary adenopathy  Left upper body: No axillary adenopathy  Lower Body: No right inguinal adenopathy  No left inguinal adenopathy  Skin:     General: Skin is warm and dry  Neurological:      Mental Status: She is alert and oriented to person, place, and time     Psychiatric:         Mood and Affect: Mood normal          Behavior: Behavior normal          Thought Content: Thought content normal          Judgment: Judgment normal

## 2022-10-11 PROBLEM — Z12.11 COLON CANCER SCREENING: Status: RESOLVED | Noted: 2022-02-24 | Resolved: 2022-10-11

## 2022-10-17 ENCOUNTER — OFFICE VISIT (OUTPATIENT)
Dept: URGENT CARE | Facility: CLINIC | Age: 52
End: 2022-10-17
Payer: COMMERCIAL

## 2022-10-17 VITALS
TEMPERATURE: 97.5 F | OXYGEN SATURATION: 100 % | BODY MASS INDEX: 21.3 KG/M2 | DIASTOLIC BLOOD PRESSURE: 83 MMHG | WEIGHT: 120.2 LBS | SYSTOLIC BLOOD PRESSURE: 172 MMHG | HEART RATE: 96 BPM | RESPIRATION RATE: 18 BRPM | HEIGHT: 63 IN

## 2022-10-17 DIAGNOSIS — J40 BRONCHITIS: Primary | ICD-10-CM

## 2022-10-17 PROCEDURE — 99213 OFFICE O/P EST LOW 20 MIN: CPT

## 2022-10-17 RX ORDER — AZITHROMYCIN 250 MG/1
TABLET, FILM COATED ORAL
Qty: 6 TABLET | Refills: 0 | Status: SHIPPED | OUTPATIENT
Start: 2022-10-17 | End: 2022-10-21

## 2022-10-17 NOTE — PATIENT INSTRUCTIONS
Azithromycin as directed  Continue over-the-counter medications, fluids rest   Follow-up in 4 to 5 days if no improvement  Sooner if anything changes or worsens

## 2022-10-17 NOTE — PROGRESS NOTES
NAME: Aleah Lagunas is a 47 y o  female  : 1970    MRN: [id number]      Assessment and Plan   Bronchitis [J40]  1  Bronchitis  azithromycin (ZITHROMAX) 250 mg tablet      discussed with patient control azithromycin  Offered prednisone but patient declines  Discussed continue over-the-counter medications, fluids and rest   Follow-up with PCP if no improvement in 4-5 days  Follow-up sooner go the ER if anything changes or worsens  She acknowledges      Patient Instructions     Patient Instructions   Azithromycin as directed  Continue over-the-counter medications, fluids rest   Follow-up in 4 to 5 days if no improvement  Sooner if anything changes or worsens    Proceed to ER if symptoms worsen  Chief Complaint     Chief Complaint   Patient presents with   • Cough     Pt has had cough/nasal & chest congestion/post-nasal drip x 3weeks-denies sore throat/n/v/d-declining rapid covid swab         History of Present Illness   Patient with no reported past medical history presents complaining of cough and congestion x3 weeks  States she had a cold but the symptoms have been lingering  Continues to have sinus pain or pressure with runny nose, chest tightness with chest congestion an intermittently productive cough  Reports was taking Mucinex which is helping with the production but stopped as it directed her to stop taking after 7 days on the box  Denies any chest pain or trouble breathing  No leg swelling  Review of Systems   Review of Systems   Constitutional: Negative for chills and fever  HENT: Positive for congestion, rhinorrhea and sinus pressure  Negative for sore throat  Respiratory: Positive for cough and chest tightness  Negative for shortness of breath, wheezing and stridor  Cardiovascular: Negative for chest pain, palpitations and leg swelling  Gastrointestinal: Negative for diarrhea, nausea and vomiting  Musculoskeletal: Negative for myalgias     Neurological: Negative for dizziness and weakness  Current Medications       Current Outpatient Medications:   •  acetaminophen (TYLENOL) 500 mg tablet, Take 500 mg by mouth every 6 (six) hours as needed for mild pain, Disp: , Rfl:   •  azithromycin (ZITHROMAX) 250 mg tablet, Take 2 tablets today then 1 tablet daily x 4 days, Disp: 6 tablet, Rfl: 0  •  Multiple Vitamins-Calcium (ONE-A-DAY WOMENS FORMULA PO), Take by mouth, Disp: , Rfl:   •  Probiotic Product (PROBIOTIC + TURMERIC EXTRACT) 400 MG CAPS, Take 580 mg by mouth, Disp: , Rfl:     Current Allergies     Allergies as of 10/17/2022 - Reviewed 10/17/2022   Allergen Reaction Noted   • Penicillin v Hives 08/06/2018   • Melatonin Hives 01/15/2021   • Strawberry (diagnostic) - food allergy Hives 09/07/2021   • Ibuprofen Rash 08/06/2018              Past Medical History:   Diagnosis Date   • Abnormal uterine bleeding     hysterectomy today 12/27/2019   • Endometrial cancer ([facility] [address] ) 2019   • Fibroids     uterine   • Headache    • Motion sickness    • Papanicolaou smear 11/26/2019   • Wears glasses        Past Surgical History:   Procedure Laterality Date   • HYSTERECTOMY N/A 12/27/2019    Procedure: HYSTERECTOMY TOTAL ABDOMINAL ([place]);   Surgeon: Katja Navarrete MD;  Location: [facility];  Service: Gynecology Oncology   • LAPAROTOMY N/A 12/27/2019    Procedure: Valeria Shin;  Surgeon: Katja Navarrete MD;  Location: [facility];  Service: Gynecology Oncology   • OOPHORECTOMY Bilateral 12/27/2019   • WISDOM TOOTH EXTRACTION         Family History   Problem Relation Age of Onset   • No Known Problems Mother    • Colon polyps Father    • No Known Problems Sister    • Colon cancer Maternal Aunt 44   • Colon cancer Maternal Aunt         70's   • No Known Problems Maternal Aunt    • No Known Problems Maternal Aunt    • Cancer Paternal Aunt    • No Known Problems Paternal Aunt    • No Known Problems Paternal Aunt    • Pancreatic cancer Paternal Uncle         early 66's   • No Known Problems Maternal Grandmother    • No Known Problems Maternal Grandfather    • No Known Problems Paternal Grandmother    • No Known Problems Paternal Grandfather    • Breast cancer Neg Hx    • Ovarian cancer Neg Hx          Medications have been verified  The following portions of the patient's history were reviewed and updated as appropriate: allergies, current medications, past family history, past medical history, past social history, past surgical history and problem list     Objective   BP (!) 172/83   Pulse 96   Temp 97 5 °F (36 4 °C)   Resp 18   Ht 5' 3" (1 6 m)   Wt 54 5 kg (120 lb 3 2 oz)   LMP 12/13/2019 (Exact Date)   SpO2 100%   BMI 21 29 kg/m²      Physical Exam     Physical Exam  Vitals and nursing note reviewed  Constitutional:       General: She is not in acute distress  Appearance: Normal appearance  She is not ill-appearing, toxic-appearing or diaphoretic  Cardiovascular:      Rate and Rhythm: Normal rate and regular rhythm  Heart sounds: Normal heart sounds  Pulmonary:      Effort: Pulmonary effort is normal  No respiratory distress  Breath sounds: Normal breath sounds  No stridor  No wheezing, rhonchi or rales  Musculoskeletal:      Cervical back: Normal range of motion  No rigidity  Lymphadenopathy:      Cervical: No cervical adenopathy  Skin:     Capillary Refill: Capillary refill takes less than 2 seconds  Neurological:      Mental Status: She is alert and oriented to person, place, and time

## 2022-11-30 ENCOUNTER — TELEPHONE (OUTPATIENT)
Dept: GYNECOLOGIC ONCOLOGY | Facility: CLINIC | Age: 52
End: 2022-11-30

## 2022-11-30 NOTE — TELEPHONE ENCOUNTER
Called and left a voicemail with [facility] number to reschedule her appointment on 03/02/23  Dr Bill Keith will not be in the office that day 
Xray Shoulder 2 Views, Right

## 2022-12-19 ENCOUNTER — HOSPITAL ENCOUNTER (EMERGENCY)
Facility: HOSPITAL | Age: 52
Discharge: HOME | End: 2022-12-19
Attending: EMERGENCY MEDICINE

## 2022-12-19 ENCOUNTER — OFFICE VISIT (OUTPATIENT)
Dept: URGENT CARE | Facility: CLINIC | Age: 52
End: 2022-12-19

## 2022-12-19 ENCOUNTER — APPOINTMENT (OUTPATIENT)
Dept: RADIOLOGY | Facility: HOSPITAL | Age: 52
End: 2022-12-19

## 2022-12-19 VITALS
HEART RATE: 117 BPM | SYSTOLIC BLOOD PRESSURE: 139 MMHG | RESPIRATION RATE: 18 BRPM | OXYGEN SATURATION: 94 % | DIASTOLIC BLOOD PRESSURE: 74 MMHG | TEMPERATURE: 99.7 F

## 2022-12-19 VITALS
SYSTOLIC BLOOD PRESSURE: 136 MMHG | BODY MASS INDEX: 21.05 KG/M2 | WEIGHT: 118.8 LBS | DIASTOLIC BLOOD PRESSURE: 67 MMHG | HEIGHT: 63 IN | OXYGEN SATURATION: 93 % | RESPIRATION RATE: 18 BRPM | HEART RATE: 129 BPM | TEMPERATURE: 99.9 F

## 2022-12-19 DIAGNOSIS — R06.02 SHORTNESS OF BREATH: Primary | ICD-10-CM

## 2022-12-19 DIAGNOSIS — J18.9 PNEUMONIA: Primary | ICD-10-CM

## 2022-12-19 LAB
ATRIAL RATE: 130 BPM
FLUAV RNA RESP QL NAA+PROBE: NEGATIVE
FLUBV RNA RESP QL NAA+PROBE: NEGATIVE
P AXIS: 78 DEGREES
PR INTERVAL: 128 MS
QRS AXIS: -55 DEGREES
QRSD INTERVAL: 74 MS
QT INTERVAL: 260 MS
QTC INTERVAL: 382 MS
RSV RNA RESP QL NAA+PROBE: NEGATIVE
SARS-COV-2 RNA RESP QL NAA+PROBE: NEGATIVE
T WAVE AXIS: 9 DEGREES
VENTRICULAR RATE: 130 BPM

## 2022-12-19 RX ORDER — ALBUTEROL SULFATE 90 UG/1
2 AEROSOL, METERED RESPIRATORY (INHALATION) EVERY 6 HOURS PRN
Qty: 8.5 G | Refills: 0 | Status: SHIPPED | OUTPATIENT
Start: 2022-12-19

## 2022-12-19 RX ORDER — AZITHROMYCIN 250 MG/1
TABLET, FILM COATED ORAL
Qty: 6 TABLET | Refills: 0 | Status: SHIPPED | OUTPATIENT
Start: 2022-12-20 | End: 2022-12-23

## 2022-12-19 RX ORDER — METHYLPREDNISOLONE 4 MG/1
TABLET ORAL
Qty: 1 EACH | Refills: 0 | Status: SHIPPED | OUTPATIENT
Start: 2022-12-19

## 2022-12-19 RX ORDER — ACETAMINOPHEN 325 MG/1
975 TABLET ORAL ONCE
Status: COMPLETED | OUTPATIENT
Start: 2022-12-19 | End: 2022-12-19

## 2022-12-19 RX ORDER — GUAIFENESIN/DEXTROMETHORPHAN 100-10MG/5
10 SYRUP ORAL ONCE
Status: COMPLETED | OUTPATIENT
Start: 2022-12-19 | End: 2022-12-19

## 2022-12-19 RX ORDER — AZITHROMYCIN 500 MG/1
500 TABLET, FILM COATED ORAL ONCE
Status: COMPLETED | OUTPATIENT
Start: 2022-12-19 | End: 2022-12-19

## 2022-12-19 RX ADMIN — ACETAMINOPHEN 975 MG: 325 TABLET ORAL at 18:42

## 2022-12-19 RX ADMIN — AZITHROMYCIN MONOHYDRATE 500 MG: 500 TABLET ORAL at 19:37

## 2022-12-19 RX ADMIN — GUAIFENESIN SYRUP AND DEXTROMETHORPHAN 10 ML: 100; 10 SYRUP ORAL at 18:42

## 2022-12-19 NOTE — ED PROVIDER NOTES
History  Chief Complaint   Patient presents with   • Shortness of Breath     Patient presents to ED with dry cough, fever since  Saturday night  Patient reports being told to come in for eval after having low pulse ox of 93% while ambulating      Patient complains of Dry cough shortness of breath chills fatigue 3 days now since being around [facility] smoke  Took Tylenol without relief  Prior to Admission Medications   Prescriptions Last Dose Informant Patient Reported? Taking? Multiple Vitamins-Calcium (ONE-A-DAY WOMENS FORMULA PO)   Yes No   Sig: Take by mouth   Probiotic Product (PROBIOTIC + TURMERIC EXTRACT) 400 MG CAPS   Yes No   Sig: Take 580 mg by mouth   acetaminophen (TYLENOL) 500 mg tablet   Yes No   Sig: Take 500 mg by mouth every 6 (six) hours as needed for mild pain   albuterol (ProAir HFA) 90 mcg/act inhaler   No No   Sig: Inhale 2 puffs every 6 (six) hours as needed for wheezing   methylPREDNISolone 4 MG tablet therapy pack   No No   Sig: Use as directed on package      Facility-Administered Medications: None       Past Medical History:   Diagnosis Date   • Abnormal uterine bleeding     hysterectomy today 12/27/2019   • Endometrial cancer ([facility] [address] ) 2019   • Fibroids     uterine   • Headache    • Motion sickness    • Papanicolaou smear 11/26/2019   • Wears glasses        Past Surgical History:   Procedure Laterality Date   • HYSTERECTOMY N/A 12/27/2019    Procedure: HYSTERECTOMY TOTAL ABDOMINAL ([place]);   Surgeon: Mario Goodman MD;  [facility];  Service: Gynecology Oncology   • LAPAROTOMY N/A 12/27/2019    Procedure: Evelyne Amandeep;  Surgeon: Mario Goodman MD;  [facility];  Service: Gynecology Oncology   • OOPHORECTOMY Bilateral 12/27/2019   • WISDOM TOOTH EXTRACTION         Family History   Problem Relation Age of Onset   • No Known Problems Mother    • Colon polyps Father    • No Known Problems Sister    • Colon cancer Maternal Aunt 44   • Colon cancer Maternal Aunt         70's   • No Known Problems Maternal Aunt    • No Known Problems Maternal Aunt    • Cancer Paternal Aunt    • No Known Problems Paternal Aunt    • No Known Problems Paternal Aunt    • Pancreatic cancer Paternal Uncle         early 66's   • No Known Problems Maternal Grandmother    • No Known Problems Maternal Grandfather    • No Known Problems Paternal Grandmother    • No Known Problems Paternal Grandfather    • Breast cancer Neg Hx    • Ovarian cancer Neg Hx      I have reviewed and agree with the history as documented  E-Cigarette/Vaping   • E-Cigarette Use Never User      E-Cigarette/Vaping Substances   • Nicotine No    • THC No    • CBD No    • Flavoring No    • Other No    • Unknown No      Social History     Tobacco Use   • Smoking status: Never   • Smokeless tobacco: Never   Vaping Use   • Vaping Use: Never used   Substance Use Topics   • Alcohol use: Not Currently     Comment: Rarely, 1 or 2 drinks per year   • Drug use: Never       Review of Systems   Constitutional: Positive for chills and fatigue  Negative for fever  HENT: Negative for rhinorrhea and sore throat  Respiratory: Positive for cough and shortness of breath  Cardiovascular: Negative for chest pain  Gastrointestinal: Negative for constipation, diarrhea, nausea and vomiting  Genitourinary: Negative for dysuria and frequency  Skin: Negative for rash  All other systems reviewed and are negative  Physical Exam  Physical Exam  Vitals and nursing note reviewed  Constitutional:       Appearance: She is well-developed  HENT:      Head: Normocephalic and atraumatic  Right Ear: External ear normal       Left Ear: External ear normal       Nose: Nose normal    Eyes:      Conjunctiva/sclera: Conjunctivae normal       Pupils: Pupils are equal, round, and reactive to light  Cardiovascular:      Rate and Rhythm: Regular rhythm  Tachycardia present  Heart sounds: Normal heart sounds     Pulmonary:      Effort: Pulmonary effort is normal  No respiratory distress  Breath sounds: Examination of the right-lower field reveals rales  Examination of the left-lower field reveals rales  Rales present  No wheezing  Abdominal:      General: Bowel sounds are normal  There is no distension  Palpations: Abdomen is soft  Tenderness: There is no abdominal tenderness  Musculoskeletal:         General: No deformity  Normal range of motion  Cervical back: Normal range of motion and neck supple  No spinous process tenderness  Skin:     General: Skin is warm and dry  Findings: No rash  Neurological:      General: No focal deficit present  Mental Status: She is alert  GCS: GCS eye subscore is 4  GCS verbal subscore is 5  GCS motor subscore is 6  Sensory: No sensory deficit     Psychiatric:         Mood and Affect: Mood normal          Vital Signs  ED Triage Vitals   Temperature Pulse Respirations Blood Pressure SpO2   12/19/22 1438 12/19/22 1438 12/19/22 1438 12/19/22 1438 12/19/22 1438   99 7 °F (37 6 °C) (!) 129 18 142/82 95 %      Temp Source Heart Rate Source Patient Position - Orthostatic VS BP Location FiO2 (%)   12/19/22 1438 12/19/22 1829 12/19/22 1438 12/19/22 1438 --   Oral Monitor Sitting Left arm       Pain Score       12/19/22 1930       No Pain           Vitals:    12/19/22 1438 12/19/22 1829 12/19/22 1900 12/19/22 1930   BP: 142/82 139/92  139/74   Pulse: (!) 129 (!) 131 (!) 125 (!) 117   Patient Position - Orthostatic VS: Sitting Sitting  Lying         Visual Acuity      ED Medications  Medications   acetaminophen (TYLENOL) tablet 975 mg (975 mg Oral Given 12/19/22 1842)   dextromethorphan-guaiFENesin (ROBITUSSIN DM) oral syrup 10 mL (10 mL Oral Given 12/19/22 1842)   azithromycin (ZITHROMAX) tablet 500 mg (500 mg Oral Given 12/19/22 1937)       Diagnostic Studies  Results Reviewed     Procedure Component Value Units Date/Time    FLU/RSV/COVID - if FLU/RSV clinically relevant [[id number]]  (Normal) Collected: 12/19/22 1827    Lab Status: Final result Specimen: Nares from Nose Updated: 12/19/22 1922     SARS-CoV-2 Negative     INFLUENZA A PCR Negative     INFLUENZA B PCR Negative     RSV PCR Negative    Narrative:      FOR PEDIATRIC PATIENTS - copy/paste COVID Guidelines URL to browser: [email or web address]    SARS-CoV-2 assay is a Nucleic Acid Amplification assay intended for the  qualitative detection of nucleic acid from SARS-CoV-2 in nasopharyngeal  swabs  Results are for the presumptive identification of SARS-CoV-2 RNA  Positive results are indicative of infection with SARS-CoV-2, the virus  causing COVID-19, but do not rule out bacterial infection or co-infection  with other viruses  Laboratories within the [place] and its  territories are required to report all positive results to the appropriate  public health authorities  Negative results do not preclude SARS-CoV-2  infection and should not be used as the sole basis for treatment or other  patient management decisions  Negative results must be combined with  clinical observations, patient history, and epidemiological information  This test has not been FDA cleared or approved  This test has been authorized by FDA under an Emergency Use Authorization  (EUA)  This test is only authorized for the duration of time the  declaration that circumstances exist justifying the authorization of the  emergency use of an in vitro diagnostic tests for detection of SARS-CoV-2  virus and/or diagnosis of COVID-19 infection under section 564(b)(1) of  the Act, 21 U  S C  267IBC-1(I)(4), unless the authorization is terminated  or revoked sooner  The test has been validated but independent review by FDA  and CLIA is pending  Test performed using [employer] GeneXpert: This RT-PCR assay targets N2,  a region unique to SARS-CoV-2   A conserved region in the E-gene was chosen  for pan-Sarbecovirus detection which includes SARS-CoV-2  According to CMS-2020-01-R, this platform meets the definition of high-throughput technology  XR chest 2 views   Final Result by Estrada Martinez MD (12/19 1649)      Patchy bibasilar opacities, likely reflecting atelectasis versus pneumonia  Workstation performed: [id number]                    Procedures  ECG 12 Lead Documentation Only    Date/Time: 12/20/2022 12:49 AM  Performed by: Dev Gray DO  Authorized by: Dev Gray DO     Indications / Diagnosis:  Cough sob  ECG reviewed by me, the ED Provider: yes    Patient location:  ED  Previous ECG:     Previous ECG:  Unavailable  Interpretation:     Interpretation: non-specific    Rate:     ECG rate:  130    ECG rate assessment: tachycardic    Rhythm:     Rhythm: sinus tachycardia    Ectopy:     Ectopy: none    QRS:     QRS axis:  Left    QRS intervals:  Normal  Conduction:     Conduction: normal    ST segments:     ST segments:  Normal  T waves:     T waves: normal               ED Course                               SBIRT 22yo+    Flowsheet Row Most Recent Value   SBIRT (23 yo +)    In order to provide better care to our patients, we are screening all of our patients for alcohol and drug use  Would it be okay to ask you these screening questions? Yes Filed at: 12/19/2022 1904   Initial Alcohol Screen: US AUDIT-C     1  How often do you have a drink containing alcohol? 0 Filed at: 12/19/2022 1904   2  How many drinks containing alcohol do you have on a typical day you are drinking? 0 Filed at: 12/19/2022 1904   3a  Male UNDER 65: How often do you have five or more drinks on one occasion? 0 Filed at: 12/19/2022 1904   3b  FEMALE Any Age, or MALE 65+: How often do you have 4 or more drinks on one occassion? 0 Filed at: 12/19/2022 1904   Audit-C Score 0 Filed at: 12/19/2022 1904   TANYA: How many times in the past year have you        Used an illegal drug or used a prescription medication for non-medical reasons? Never Filed at: 12/19/2022 1904                    MDM  Number of Diagnoses or Management Options  Pneumonia: new and requires workup     Amount and/or Complexity of Data Reviewed  Clinical lab tests: ordered and reviewed  Tests in the radiology section of CPT®: ordered and reviewed    Patient Progress  Patient progress: improved      Disposition  Final diagnoses:   Pneumonia     Time reflects when diagnosis was documented in both MDM as applicable and the Disposition within this note     Time User Action Codes Description Comment    12/19/2022  7:34 PM Bon Pennington [J18 9] Pneumonia       ED Disposition     ED Disposition   Discharge    Condition   Stable    Date/Time   Mon Dec 19, 2022  7:33 PM    Comment   Sera Patrick discharge to home/self care  Follow-up Information     Follow up With Specialties Details Why Contact Info    Brenda Hermosillo DO Family Medicine Call   [facility] [address]  [facility] [address]  [phone]            Discharge Medication List as of 12/19/2022  7:35 PM      START taking these medications    Details   azithromycin (ZITHROMAX) 250 mg tablet Take 2 tablets today then 1 tablet daily x 4 days Do not start before December 20, 2022 , Normal         CONTINUE these medications which have NOT CHANGED    Details   acetaminophen (TYLENOL) 500 mg tablet Take 500 mg by mouth every 6 (six) hours as needed for mild pain, Historical Med      albuterol (ProAir HFA) 90 mcg/act inhaler Inhale 2 puffs every 6 (six) hours as needed for wheezing, Starting Mon 12/19/2022, Normal      methylPREDNISolone 4 MG tablet therapy pack Use as directed on package, Normal      Multiple Vitamins-Calcium (ONE-A-DAY WOMENS FORMULA PO) Take by mouth, Historical Med      Probiotic Product (PROBIOTIC + TURMERIC EXTRACT) 400 MG CAPS Take 580 mg by mouth, Historical Med             No discharge procedures on file      PDMP Review     None          ED Provider  Electronically Signed by           Chioma Collins DO  12/21/22 0004

## 2022-12-19 NOTE — PROGRESS NOTES
[facility] [employer] [facility]        NAME: Dev Ernst is a 47 y o  female  : 1970    MRN: [id number]  DATE: 2022  TIME: 12:53 PM    Assessment and Plan   Shortness of breath [R06 02]  1  Shortness of breath  Cov/Flu-Collected at [facility]    albuterol (ProAir HFA) 90 mcg/act inhaler    methylPREDNISolone 4 MG tablet therapy pack        COVID and flu sent out    Patient was educated on high pulse and O2 at 93  O2 remained between 92-93 with walking    Patient Instructions       Patient was educated on acute cough  Patient was educated on inhaler and steroids prescribed  Patient was educated on side effects  Patient was educated on hydration  Patient was told if she is having shortness of breath or struggling to breath she needs to go directly to ED    Chief Complaint     Chief Complaint   Patient presents with   • Cough   • Chills   • Fatigue     Started with cold symptoms on 22  Has very little energy and cant stop coughing  History of Present Illness       Patient is here today complaining of dry cough, chills, fatigue, decreased appetite, and shortness of breath since 22  Patients allergies were reviewed in chart  Denies any history of asthma or diabetes  Denies any chest pain  Review of Systems   Review of Systems   Constitutional: Positive for appetite change, chills, fatigue and fever  HENT: Positive for congestion, sinus pressure, sinus pain and sore throat  Respiratory: Positive for cough and shortness of breath  Cardiovascular: Negative  Neurological: Negative  Psychiatric/Behavioral: Negative            Current Medications       Current Outpatient Medications:   •  albuterol (ProAir HFA) 90 mcg/act inhaler, Inhale 2 puffs every 6 (six) hours as needed for wheezing, Disp: 8 5 g, Rfl: 0  •  methylPREDNISolone 4 MG tablet therapy pack, Use as directed on package, Disp: 1 each, Rfl: 0  •  acetaminophen (TYLENOL) 500 mg tablet, Take 500 mg by mouth every 6 (six) hours as needed for mild pain, Disp: , Rfl:   •  Multiple Vitamins-Calcium (ONE-A-DAY WOMENS FORMULA PO), Take by mouth, Disp: , Rfl:   •  Probiotic Product (PROBIOTIC + TURMERIC EXTRACT) 400 MG CAPS, Take 580 mg by mouth, Disp: , Rfl:     Current Allergies     Allergies as of 12/19/2022 - Reviewed 12/19/2022   Allergen Reaction Noted   • Penicillin v Hives 08/06/2018   • Melatonin Hives 01/15/2021   • Strawberry (diagnostic) - food allergy Hives 09/07/2021   • Ibuprofen Rash 08/06/2018            The following portions of the patient's history were reviewed and updated as appropriate: allergies, current medications, past family history, past medical history, past social history, past surgical history and problem list      Past Medical History:   Diagnosis Date   • Abnormal uterine bleeding     hysterectomy today 12/27/2019   • Endometrial cancer ([facility] [address] ) 2019   • Fibroids     uterine   • Headache    • Motion sickness    • Papanicolaou smear 11/26/2019   • Wears glasses        Past Surgical History:   Procedure Laterality Date   • HYSTERECTOMY N/A 12/27/2019    Procedure: HYSTERECTOMY TOTAL ABDOMINAL ([place]);   Surgeon: Hai Ramires MD;  [facility];  Service: Gynecology Oncology   • LAPAROTOMY N/A 12/27/2019    Procedure: Azalia Lee;  Surgeon: Hai Ramires MD;  [facility];  Service: Gynecology Oncology   • OOPHORECTOMY Bilateral 12/27/2019   • WISDOM TOOTH EXTRACTION         Family History   Problem Relation Age of Onset   • No Known Problems Mother    • Colon polyps Father    • No Known Problems Sister    • Colon cancer Maternal Aunt 44   • Colon cancer Maternal Aunt         70's   • No Known Problems Maternal Aunt    • No Known Problems Maternal Aunt    • Cancer Paternal Aunt    • No Known Problems Paternal Aunt    • No Known Problems Paternal Aunt    • Pancreatic cancer Paternal Uncle         early 66's   • No Known Problems Maternal Grandmother    • No Known Problems Maternal Grandfather    • No Known Problems Paternal Grandmother    • No Known Problems Paternal Grandfather    • Breast cancer Neg Hx    • Ovarian cancer Neg Hx          Medications have been verified  Objective   /67   Pulse (!) 129   Temp 99 9 °F (37 7 °C)   Resp 18   Ht 5' 3" (1 6 m)   Wt 53 9 kg (118 lb 12 8 oz)   LMP 12/13/2019 (Exact Date)   SpO2 93%   BMI 21 04 kg/m²   Patient's last menstrual period was 12/13/2019 (exact date)  Physical Exam     Physical Exam  Vitals and nursing note reviewed  Constitutional:       Appearance: Normal appearance  HENT:      Head: Normocephalic  Right Ear: Tympanic membrane, ear canal and external ear normal       Left Ear: Tympanic membrane, ear canal and external ear normal       Nose: Nose normal       Mouth/Throat:      Mouth: Mucous membranes are moist       Pharynx: No oropharyngeal exudate  Eyes:      Extraocular Movements: Extraocular movements intact  Pupils: Pupils are equal, round, and reactive to light  Cardiovascular:      Rate and Rhythm: Normal rate and regular rhythm  Heart sounds: Normal heart sounds  Pulmonary:      Comments: Shortness of breath  Neurological:      General: No focal deficit present  Mental Status: She is alert and oriented to person, place, and time     Psychiatric:         Mood and Affect: Mood normal          Behavior: Behavior normal

## 2022-12-19 NOTE — PATIENT INSTRUCTIONS
Patient was educated on acute cough  Patient was educated on inhaler and steroids prescribed  Patient was educated on side effects  Patient was educated on hydration  Patient was told if she is having shortness of breath or struggling to breath she needs to go directly to ED    Acute Cough   WHAT YOU NEED TO KNOW:   An acute cough can last up to 3 weeks  Common causes of an acute cough include a cold, allergies, or a lung infection  DISCHARGE INSTRUCTIONS:   Return to the emergency department if:   You have trouble breathing or feel short of breath  You cough up blood, or you see blood in your mucus  You faint or feel weak or dizzy  You have chest pain when you cough or take a deep breath  You have new wheezing  Contact your healthcare provider if:   You have a fever  Your cough lasts longer than 4 weeks  Your symptoms do not improve with treatment  You have questions or concerns about your condition or care  Medicines:   Medicines  may be needed to stop the cough, decrease swelling in your airways, or help open your airways  Medicine may also be given to help you cough up mucus  Ask your healthcare provider what over-the-counter medicines you can take  If you have an infection caused by bacteria, you may need antibiotics  Take your medicine as directed  Contact your healthcare provider if you think your medicine is not helping or if you have side effects  Tell him or her if you are allergic to any medicine  Keep a list of the medicines, vitamins, and herbs you take  Include the amounts, and when and why you take them  Bring the list or the pill bottles to follow-up visits  Carry your medicine list with you in case of an emergency  Manage your symptoms:   Do not smoke and stay away from others who smoke  Nicotine and other chemicals in cigarettes and cigars can cause lung damage and make your cough worse   Ask your healthcare provider for information if you currently smoke and need help to quit  E-cigarettes or smokeless tobacco still contain nicotine  Talk to your healthcare provider before you use these products  Drink extra liquids as directed  Liquids will help thin and loosen mucus so you can cough it up  Liquids will also help prevent dehydration  Examples of good liquids to drink include water, fruit juice, and broth  Do not drink liquids that contain caffeine  Caffeine can increase your risk for dehydration  Ask your healthcare provider how much liquid to drink each day  Rest as directed  Do not do activities that make your cough worse, such as exercise  Use a humidifier or vaporizer  Use a cool mist humidifier or a vaporizer to increase air moisture in your home  This may make it easier for you to breathe and help decrease your cough  Eat 2 to 5 mL of honey 2 times each day  Honey can help thin mucus and decrease your cough  Use cough drops or lozenges  These can help decrease throat irritation and your cough  Follow up with your healthcare provider as directed:  Write down your questions so you remember to ask them during your visits  © Copyright [employer] 2022 Information is for End User's use only and may not be sold, redistributed or otherwise used for commercial purposes  All illustrations and images included in CareNotes® are the copyrighted property of A D A M , Inc  or [facility] Prachi Álvarez   The above information is an  only  It is not intended as medical advice for individual conditions or treatments  Talk to your doctor, nurse or pharmacist before following any medical regimen to see if it is safe and effective for you

## 2022-12-20 LAB
FLUAV RNA RESP QL NAA+PROBE: NEGATIVE
FLUBV RNA RESP QL NAA+PROBE: NEGATIVE
SARS-COV-2 RNA RESP QL NAA+PROBE: NEGATIVE

## 2022-12-21 ENCOUNTER — TELEPHONE (OUTPATIENT)
Dept: URGENT CARE | Facility: CLINIC | Age: 52
End: 2022-12-21

## 2023-01-12 ENCOUNTER — HOSPITAL ENCOUNTER (OUTPATIENT)
Dept: MAMMOGRAPHY | Facility: IMAGING CENTER | Age: 53
Discharge: HOME | End: 2023-01-12

## 2023-01-12 VITALS — BODY MASS INDEX: 20.38 KG/M2 | HEIGHT: 63 IN | WEIGHT: 115 LBS

## 2023-01-12 DIAGNOSIS — Z12.31 ENCOUNTER FOR SCREENING MAMMOGRAM FOR MALIGNANT NEOPLASM OF BREAST: ICD-10-CM

## 2023-03-23 ENCOUNTER — TELEPHONE (OUTPATIENT)
Age: 53
End: 2023-03-23

## 2023-04-04 ENCOUNTER — TELEPHONE (OUTPATIENT)
Dept: GYNECOLOGIC ONCOLOGY | Facility: CLINIC | Age: 53
End: 2023-04-04

## 2023-04-04 NOTE — TELEPHONE ENCOUNTER
Patient called back into the office to reschedule the appointment with Dr Tanmay Blanco  Patient will be seen on May 11th @ 945am in [place]  Patient preferred the 592 appt  Patient would also like to get a price check on this appointment since she is self- pay   Please contact the patient back @[phone]

## 2023-05-10 ENCOUNTER — TELEPHONE (OUTPATIENT)
Dept: OTHER | Facility: OTHER | Age: 53
End: 2023-05-10

## 2023-05-11 ENCOUNTER — TELEPHONE (OUTPATIENT)
Dept: CARDIAC SURGERY | Facility: CLINIC | Age: 53
End: 2023-05-11

## 2023-05-11 NOTE — TELEPHONE ENCOUNTER
Patient called into the office to r/s her appt with Dr Sammy Dai due to her son being sick   Patient is now scheduled for May 25th in [place] with NT

## 2023-05-11 NOTE — TELEPHONE ENCOUNTER
Patient is calling regarding cancelling an appointment  Date/Time:05/10/2023/ 9:45 a m      Patient was rescheduled: YES [] NO [x]    Patient requesting call back to reschedule: YES [x] NO []

## 2023-05-25 ENCOUNTER — OFFICE VISIT (OUTPATIENT)
Age: 53
End: 2023-05-25

## 2023-05-25 VITALS
DIASTOLIC BLOOD PRESSURE: 76 MMHG | BODY MASS INDEX: 21.69 KG/M2 | WEIGHT: 122.4 LBS | SYSTOLIC BLOOD PRESSURE: 145 MMHG | OXYGEN SATURATION: 98 % | HEIGHT: 63 IN | TEMPERATURE: 98.6 F | HEART RATE: 76 BPM

## 2023-05-25 DIAGNOSIS — Z85.42 HISTORY OF ENDOMETRIAL CANCER: Primary | ICD-10-CM

## 2023-05-25 NOTE — PROGRESS NOTES
Assessment/Plan:    Problem List Items Addressed This Visit        Other    History of endometrial cancer - Primary     25-year-old with a history of stage Ia grade 1 endometrial cancer  She is clinically without evidence of disease recurrence  Her performance status is 0   1   Return in 1 year for endometrial cancer surveillance  CHIEF COMPLAINT: Endometrial cancer surveillance      Problem:  Cancer Staging   History of endometrial cancer  Staging form: Corpus Uteri - Carcinoma, AJCC 8th Edition  - Clinical stage from 1/14/2020: FIGO Stage I (cT1, cN0, cM0) - Signed by Sydney Gabriel MD on 1/14/2020  - Pathologic stage from 1/14/2020: FIGO Stage IA (pT1a, pN0, cM0) - Signed by Sydney Gabriel MD on 1/14/2020        Previous therapy:  Oncology History   History of endometrial cancer   12/27/2019 Surgery    Total abdominal hysterectomy bilateral salpingo-oophorectomy for stage IA grade 1 noninvasive endometrial adenocarcinoma  No further treatment required  Gorman syndrome negative     1/14/2020 Initial Diagnosis    Endometrial cancer ([facility] [address] )     1/14/2020 -  Cancer Staged    Staging form: Corpus Uteri - Carcinoma, AJCC 8th Edition  - Pathologic stage from 1/14/2020: FIGO Stage IA (pT1a, pN0, cM0) - Signed by Sydney Gabriel MD on 1/14/2020  Stage used in treatment planning: Yes  National guidelines used in treatment planning: Yes       1/14/2020 -  Cancer Staged    Staging form: Corpus Uteri - Carcinoma, AJCC 8th Edition  - Clinical stage from 1/14/2020: FIGO Stage I (cT1, cN0, cM0) - Signed by Sydney Gabriel MD on 1/14/2020  Histologic grade (G): G1  Histologic grading system: 3 grade system  Stage used in treatment planning: Yes  National guidelines used in treatment planning: Yes             Patient ID: David Mason is a 46 y o  female  Who returns for endometrial cancer surveillance  She has no new complaints  She is experiencing some life stressors    No vaginal bleeding, abdominal pain, "or pelvic pain  Her last screening mammogram on January 12, 2023 was BI-RADS Category 2  No other interval change in medications or medical history since her last visit to the office  The following portions of the patient's history were reviewed and updated as appropriate: allergies, current medications, past family history, past medical history, past social history, past surgical history and problem list     Review of Systems   Constitutional: Negative for activity change and unexpected weight change  HENT: Negative  Eyes: Negative  Respiratory: Negative  Cardiovascular: Negative  Gastrointestinal: Negative for abdominal distention and abdominal pain  Endocrine: Negative  Genitourinary: Negative for pelvic pain and vaginal bleeding  Musculoskeletal: Negative  Skin: Negative  Allergic/Immunologic: Negative  Neurological: Negative  Hematological: Negative  Psychiatric/Behavioral: Negative  Current Outpatient Medications   Medication Sig Dispense Refill   • acetaminophen (TYLENOL) 500 mg tablet Take 500 mg by mouth every 6 (six) hours as needed for mild pain     • Multiple Vitamins-Calcium (ONE-A-DAY WOMENS FORMULA PO) Take by mouth     • Probiotic Product (PROBIOTIC + TURMERIC EXTRACT) 400 MG CAPS Take 580 mg by mouth       No current facility-administered medications for this visit  Objective:    Blood pressure 145/76, pulse 76, temperature 98 6 °F (37 °C), temperature source Temporal, height 5' 3\" (1 6 m), weight 55 5 kg (122 lb 6 4 oz), last menstrual period 12/13/2019, SpO2 98 %  Body mass index is 21 68 kg/m²  Body surface area is 1 57 meters squared  Physical Exam  Vitals reviewed  Exam conducted with a chaperone present  Constitutional:       General: She is not in acute distress  Appearance: Normal appearance  She is well-developed and normal weight  She is not ill-appearing, toxic-appearing or diaphoretic     HENT:      Head: Normocephalic " and atraumatic  Eyes:      General: No scleral icterus  Extraocular Movements: Extraocular movements intact  Conjunctiva/sclera: Conjunctivae normal    Neck:      Thyroid: No thyromegaly  Pulmonary:      Effort: Pulmonary effort is normal    Abdominal:      General: There is no distension  Palpations: Abdomen is soft  There is no mass  Tenderness: There is no abdominal tenderness  There is no guarding or rebound  Hernia: No hernia is present  Genitourinary:     Comments: The external female genitalia is normal  The bartholin's, uretheral and skenes glands are normal  The urethral meatus is normal (midline with no lesions)  Anus without fissure or lesion  Speculum exam reveals a grossly normal vagina  No masses, lesions,discharge or bleeding  No significant cystocele or rectocele noted  Bimanual exam notes a surgical absent cervix, uterus and adnexal structures  No masses or fullness  Bladder is without fullness, mass or tenderness  Musculoskeletal:         General: No swelling or tenderness  Cervical back: Normal range of motion and neck supple  Right lower leg: No edema  Left lower leg: No edema  Lymphadenopathy:      Cervical: No cervical adenopathy  Skin:     General: Skin is warm and dry  Coloration: Skin is not jaundiced or pale  Findings: No lesion or rash  Neurological:      General: No focal deficit present  Mental Status: She is alert and oriented to person, place, and time  Mental status is at baseline  Cranial Nerves: No cranial nerve deficit  Motor: No weakness  Gait: Gait normal    Psychiatric:         Mood and Affect: Mood normal          Behavior: Behavior normal          Thought Content:  Thought content normal          Judgment: Judgment normal

## 2023-05-25 NOTE — ASSESSMENT & PLAN NOTE
59-year-old with a history of stage Ia grade 1 endometrial cancer  She is clinically without evidence of disease recurrence  Her performance status is 0   1   Return in 1 year for endometrial cancer surveillance

## 2023-12-08 ENCOUNTER — OFFICE VISIT (OUTPATIENT)
Dept: URGENT CARE | Facility: CLINIC | Age: 53
End: 2023-12-08
Payer: COMMERCIAL

## 2023-12-08 VITALS
HEART RATE: 86 BPM | SYSTOLIC BLOOD PRESSURE: 130 MMHG | RESPIRATION RATE: 18 BRPM | OXYGEN SATURATION: 99 % | TEMPERATURE: 97.2 F | DIASTOLIC BLOOD PRESSURE: 90 MMHG

## 2023-12-08 DIAGNOSIS — J32.9 SINUSITIS, UNSPECIFIED CHRONICITY, UNSPECIFIED LOCATION: Primary | ICD-10-CM

## 2023-12-08 LAB — S PYO AG THROAT QL: NEGATIVE

## 2023-12-08 PROCEDURE — 99213 OFFICE O/P EST LOW 20 MIN: CPT | Performed by: PHYSICIAN ASSISTANT

## 2023-12-08 PROCEDURE — 87880 STREP A ASSAY W/OPTIC: CPT | Performed by: PHYSICIAN ASSISTANT

## 2023-12-08 RX ORDER — CEFUROXIME AXETIL 500 MG/1
500 TABLET ORAL EVERY 12 HOURS SCHEDULED
Qty: 14 TABLET | Refills: 0 | Status: SHIPPED | OUTPATIENT
Start: 2023-12-08 | End: 2023-12-15

## 2023-12-08 NOTE — PROGRESS NOTES
[facility]    NAME: Elver Vazquez is a 46 y.o. female  : 1970    MRN: [id number]  DATE: 2023  TIME: 11:13 AM    Assessment and Plan   Sinusitis, unspecified chronicity, unspecified location [J32.9]  1. Sinusitis, unspecified chronicity, unspecified location  POCT rapid strepA    cefuroxime (CEFTIN) 500 mg tablet          Patient Instructions     Patient Instructions   Rapid strep test is negative. There are a number of viral respiratory illnesses that can present similarly. Most are self-limiting. Antibiotics do not help viral illnesses. In light of your son's case as well as your sinus pain and pressure will cover for potential bacterial infection. Most upper respiratory symptoms start to improve after 7-12 days but may take a few weeks to completely resolve. As with any respiratory illness, transmission precautions  are strongly advised. Masking. Isolating. Hand washing. Frequent cleaning of common use surfaces. Follow up with your PCP office if not improving over next 7-10 days. If you want to be proactive, you may contact your PCP office now to schedule follow up for near future. If you feel like your are severely worsening or have significant weakness, chest pain, shortness of breath proceed to ER for immediate further evaluation.  ---------------------------------------------------------------------------------------------------------------------------------------------------------------------------------------------------------------------------------------------------------------    Strongly encourage getting plenty of rest over the next few days. Increase your hydration. Vaporizer by bedside may also be helpful. Symptom Relief Suggestions:    Options for sore throat or hoarseness:              * Warm salt water gargles every 1-2 hours while awake        * Throat lozenges, Tylenol, voice rest, warm tea with honey.     Options for sinus pressure, nasal congestion, runny nose, and / or post nasal drip:            * Clearing your sinuses in a nice steamy shower may be helpful, especially first thing after waking. * Nasal saline rinses every 1-2 hours while awake may also help decrease nasal congestion, drainage. * Afrin nasal spray if significant nasal congestion at bedtime may use . (Do not use for over 3 days however.)       * Decongestant / expectorant such as Mucinex D 12 hour 1/2 to 1 tablet as needed with full glass of fluids may help decrease pressure and drainage. Options for ear pressure, discomfort:         * Decongestant may be helpful. * Flonase nasal spray. * Warm compresses against ear(s) for comfort. Options for help with  cough:         * Warm tea with honey or a teaspoon of honey periodically throughout day and / or before bed. * Plain Mucinex (an expectorant to help keep mucus thin so you can clear it easier) or Mucinex DM (expectorant / cough suppressant) to help decrease cough if it is bothering your sleep. Other night time cough medication options include Delsym, Robitussin DM, NyQuil. * Propping with an extra pillow or two may be helpful. * Keep water by your bedside to sip on as needed. * Cough drops. * Vaporizer by bedside. * Getting in steamy shower or bathroom. Cool air may also soothe coughing spasm. ----------------------------------------------------------------------------------------------------------------------------------------------------------------------------------------------------------------------------------------------------------------      Although bothersome, mucous is not necessarily a bad thing. Production of mucous is the body's way of trying to capture and flush irritants from mucosal surfaces. Yellow or green mucous does not necessarily mean you have a bacterial infection.    Mucous will become more discolored over time, especially first thing in the morning, as your body's immune system  floods the mucosal surfaces with white bloods cells to try and help fight  infection. This white blood cell debride can also cause mucous to be discolored. Again, using nasal saline spray frequently may help soothe and keep mucous flowing out versus getting dried, thickened and / or stuck leading to more sinus pain and pressure. If you have a cough, please realize that a cough is not necessarily a bad thing. It is a part of your body's protection mechanism to help keep your airways clear. Phlegm may be more discolored in the morning. Please note that discolored phlegm does not necessarily mean a bacterial infection. Chief Complaint     Chief Complaint   Patient presents with    Sore Throat    Cold Like Symptoms     Pt C/O sore throat that started Sunday, now with sinus congestion and a productive cough. History of Present Illness   Martha Guzman presents to the clinic c/o  69-year-old female comes in with terrible sore throat, sinus pain and pressure. Started: Few days ago. Associated signs and symptoms: Started Sunday with sore throat and pain became razor blade sharp. Tuesday Wednesday she was doing better and then Thursday sore throat worsened. Pain with swallowing. Now she is also having a lot of sinus pain and pressure. She has postnasal drip. Minimal runny nose and minimal sneezing. Taking Tylenol and Sudafed. Son has been sick recently and is currently on antibiotic for double ear infection. Also had pinkeye. Review of Systems   Review of Systems   Constitutional:  Positive for activity change, appetite change and fatigue. Negative for chills, diaphoresis and fever. HENT:  Positive for congestion, postnasal drip, rhinorrhea, sinus pressure, sinus pain, sore throat and trouble swallowing. Negative for ear discharge and ear pain. Eyes: Negative.     Respiratory:  Negative for cough, chest tightness, shortness of breath and wheezing. Cardiovascular: Negative. Hematological: Negative. Current Medications     No long-term medications on file. Current Allergies     Allergies as of 12/08/2023 - Reviewed 12/08/2023   Allergen Reaction Noted    Penicillin v Hives and Rash 08/06/2018    [place] (diagnostic) - food allergy Hives 09/07/2021    Ibuprofen Rash 08/06/2018    Melatonin Hives and Rash 01/15/2021          The following portions of the patient's history were reviewed and updated as appropriate: allergies, current medications, past family history, past medical history, past social history, past surgical history and problem list.  Past Medical History:   Diagnosis Date    Abnormal uterine bleeding     hysterectomy today 12/27/2019    Endometrial cancer ([address]) 2019    Fibroids     uterine    Headache     Motion sickness     Papanicolaou smear 11/26/2019    Wears glasses      Past Surgical History:   Procedure Laterality Date    HYSTERECTOMY N/A 12/27/2019    Procedure: HYSTERECTOMY TOTAL ABDOMINAL ([place]);   Surgeon: Francisco hSetty MD;  Location: [facility];  Service: Gynecology Oncology    LAPAROTOMY N/A 12/27/2019    Procedure: Lolis Ponce;  Surgeon: Francisco Shetty MD;  Location: [facility];  Service: Gynecology Oncology    OOPHORECTOMY Bilateral 12/27/2019    WISDOM TOOTH EXTRACTION       Family History   Problem Relation Age of Onset    No Known Problems Mother     Colon polyps Father     No Known Problems Sister     No Known Problems Maternal Grandmother     No Known Problems Maternal Grandfather     No Known Problems Paternal Grandmother     No Known Problems Paternal Grandfather     Colon cancer Maternal Aunt     Colon cancer Maternal Aunt         70's    No Known Problems Maternal Aunt     No Known Problems Maternal Aunt     Cancer Paternal Aunt         UNKNOWN ORIGIN OR AGE    No Known Problems Paternal Aunt     No Known Problems Paternal Aunt Pancreatic cancer Paternal Uncle         early 66's    Breast cancer Neg Hx     Ovarian cancer Neg Hx        Objective   /90 (BP Location: Left arm, Patient Position: Sitting, Cuff Size: Standard)   Pulse 86   Temp (!) 97.2 °F (36.2 °C) (Tympanic)   Resp 18   LMP 12/13/2019 (Exact Date)   SpO2 99%   Patient's last menstrual period was 12/13/2019 (exact date). Physical Exam     Physical Exam  Vitals and nursing note reviewed. Constitutional:       General: She is not in acute distress. Appearance: She is well-developed. She is ill-appearing. She is not toxic-appearing or diaphoretic. Comments: Appears mildly ill but in no acute distress. No trismus or conversational dyspnea. HENT:      Head: Normocephalic and atraumatic. Right Ear: Tympanic membrane, ear canal and external ear normal. No drainage, swelling or tenderness. No middle ear effusion. Tympanic membrane is not erythematous. Left Ear: Tympanic membrane, ear canal and external ear normal. No drainage, swelling or tenderness. No middle ear effusion. Tympanic membrane is not erythematous. Nose: Congestion and rhinorrhea present. Mouth/Throat:      Mouth: Mucous membranes are moist. Mucous membranes are pale. Pharynx: Uvula midline. Posterior oropharyngeal erythema present. No pharyngeal swelling, oropharyngeal exudate or uvula swelling. Tonsils: No tonsillar exudate or tonsillar abscesses. 1+ on the right. 1+ on the left. Comments: Cobblestoning posterior pharynx with patchy redness  Eyes:      General:         Right eye: No discharge. Left eye: No discharge. Extraocular Movements: Extraocular movements intact. Conjunctiva/sclera: Conjunctivae normal.      Pupils: Pupils are equal, round, and reactive to light. Cardiovascular:      Rate and Rhythm: Normal rate and regular rhythm. Heart sounds: Normal heart sounds. No murmur heard. No friction rub. No gallop. Pulmonary:      Effort: Pulmonary effort is normal. No respiratory distress. Breath sounds: Normal breath sounds. No stridor. No wheezing, rhonchi or rales. Musculoskeletal:      Cervical back: Normal range of motion and neck supple. Tenderness present. No rigidity. Lymphadenopathy:      Cervical: Cervical adenopathy present. Skin:     General: Skin is warm and dry. Coloration: Skin is not jaundiced or pale. Findings: No rash. Neurological:      Mental Status: She is alert and oriented to person, place, and time.    Psychiatric:         Mood and Affect: Mood normal.         Behavior: Behavior normal.

## 2023-12-08 NOTE — PATIENT INSTRUCTIONS
Rapid strep test is negative. There are a number of viral respiratory illnesses that can present similarly. Most are self-limiting. Antibiotics do not help viral illnesses. In light of your son's case as well as your sinus pain and pressure will cover for potential bacterial infection. Most upper respiratory symptoms start to improve after 7-12 days but may take a few weeks to completely resolve. As with any respiratory illness, transmission precautions  are strongly advised. Masking. Isolating. Hand washing. Frequent cleaning of common use surfaces. Follow up with your PCP office if not improving over next 7-10 days. If you want to be proactive, you may contact your PCP office now to schedule follow up for near future. If you feel like your are severely worsening or have significant weakness, chest pain, shortness of breath proceed to ER for immediate further evaluation.  ---------------------------------------------------------------------------------------------------------------------------------------------------------------------------------------------------------------------------------------------------------------    Strongly encourage getting plenty of rest over the next few days. Increase your hydration. Vaporizer by bedside may also be helpful. Symptom Relief Suggestions:    Options for sore throat or hoarseness:              * Warm salt water gargles every 1-2 hours while awake        * Throat lozenges, Tylenol, voice rest, warm tea with honey. Options for sinus pressure, nasal congestion, runny nose, and / or post nasal drip:            * Clearing your sinuses in a nice steamy shower may be helpful, especially first thing after waking. * Nasal saline rinses every 1-2 hours while awake may also help decrease nasal congestion, drainage. * Afrin nasal spray if significant nasal congestion at bedtime may use .   (Do not use for over 3 days however.)       * Decongestant / expectorant such as Mucinex D 12 hour 1/2 to 1 tablet as needed with full glass of fluids may help decrease pressure and drainage. Options for ear pressure, discomfort:         * Decongestant may be helpful. * Flonase nasal spray. * Warm compresses against ear(s) for comfort. Options for help with  cough:         * Warm tea with honey or a teaspoon of honey periodically throughout day and / or before bed. * Plain Mucinex (an expectorant to help keep mucus thin so you can clear it easier) or Mucinex DM (expectorant / cough suppressant) to help decrease cough if it is bothering your sleep. Other night time cough medication options include Delsym, Robitussin DM, NyQuil. * Propping with an extra pillow or two may be helpful. * Keep water by your bedside to sip on as needed. * Cough drops. * Vaporizer by bedside. * Getting in steamy shower or bathroom. Cool air may also soothe coughing spasm. ----------------------------------------------------------------------------------------------------------------------------------------------------------------------------------------------------------------------------------------------------------------      Although bothersome, mucous is not necessarily a bad thing. Production of mucous is the body's way of trying to capture and flush irritants from mucosal surfaces. Yellow or green mucous does not necessarily mean you have a bacterial infection. Mucous will become more discolored over time, especially first thing in the morning, as your body's immune system  floods the mucosal surfaces with white bloods cells to try and help fight  infection. This white blood cell debride can also cause mucous to be discolored.   Again, using nasal saline spray frequently may help soothe and keep mucous flowing out versus getting dried, thickened and / or stuck leading to more sinus pain and pressure. If you have a cough, please realize that a cough is not necessarily a bad thing. It is a part of your body's protection mechanism to help keep your airways clear. Phlegm may be more discolored in the morning. Please note that discolored phlegm does not necessarily mean a bacterial infection.

## 2023-12-08 NOTE — LETTER
December 8, 2023     Patient: Kedar Aldana   YOB: 1970   Date of Visit: 12/8/2023       To Whom It May Concern:    Patient seen in office today for acute medical ailment. May attempt return to work in the next 1 to 2 days as tolerated.        Sincerely,        Boom Schuler PA-C    CC: No Recipients

## 2024-04-22 ENCOUNTER — OFFICE VISIT (OUTPATIENT)
Dept: FAMILY MEDICINE CLINIC | Facility: CLINIC | Age: 54
End: 2024-04-22
Payer: COMMERCIAL

## 2024-04-22 VITALS
DIASTOLIC BLOOD PRESSURE: 86 MMHG | BODY MASS INDEX: 24.1 KG/M2 | TEMPERATURE: 97.9 F | SYSTOLIC BLOOD PRESSURE: 126 MMHG | HEART RATE: 99 BPM | HEIGHT: 63 IN | WEIGHT: 136 LBS | OXYGEN SATURATION: 99 %

## 2024-04-22 DIAGNOSIS — Z13.29 SCREENING FOR THYROID DISORDER: ICD-10-CM

## 2024-04-22 DIAGNOSIS — Z13.820 SCREENING FOR OSTEOPOROSIS: ICD-10-CM

## 2024-04-22 DIAGNOSIS — Z13.0 SCREENING, ANEMIA, DEFICIENCY, IRON: ICD-10-CM

## 2024-04-22 DIAGNOSIS — Z00.00 ANNUAL PHYSICAL EXAM: Primary | ICD-10-CM

## 2024-04-22 DIAGNOSIS — Z76.89 ENCOUNTER TO ESTABLISH CARE: ICD-10-CM

## 2024-04-22 DIAGNOSIS — Z12.11 SCREEN FOR COLON CANCER: ICD-10-CM

## 2024-04-22 DIAGNOSIS — Z13.220 SCREENING, LIPID: ICD-10-CM

## 2024-04-22 DIAGNOSIS — Z11.4 SCREENING FOR HIV (HUMAN IMMUNODEFICIENCY VIRUS): ICD-10-CM

## 2024-04-22 DIAGNOSIS — Z12.31 ENCOUNTER FOR SCREENING MAMMOGRAM FOR MALIGNANT NEOPLASM OF BREAST: ICD-10-CM

## 2024-04-22 DIAGNOSIS — Z13.1 SCREENING FOR DIABETES MELLITUS: ICD-10-CM

## 2024-04-22 DIAGNOSIS — Z78.0 POST-MENOPAUSAL: ICD-10-CM

## 2024-04-22 DIAGNOSIS — Z11.59 NEED FOR HEPATITIS C SCREENING TEST: ICD-10-CM

## 2024-04-22 PROCEDURE — 99386 PREV VISIT NEW AGE 40-64: CPT | Performed by: NURSE PRACTITIONER

## 2024-04-22 NOTE — PROGRESS NOTES
ADULT ANNUAL PHYSICAL  [facility]    NAME: Willow Vogt  AGE: 53 y.o. SEX: female  : 1970     DATE: 2024     Assessment and Plan:   Pleasant 53-year-old female establishing primary care in our office Comprehensive physical exam today  PMH and chronic conditions reviewed  Medications reconciled - no daily medications  Preventative care and recommended screenings as below   Fasting lab work orders placed today  Continue annual physicals  Follow-up sooner as needed  Problem List Items Addressed This Visit    None  Visit Diagnoses       Annual physical exam    -  Primary    Encounter to establish care        Relevant Orders    Ambulatory Referral to Obstetrics / Gynecology    Screening for osteoporosis        Relevant Orders    DXA bone density spine hip and pelvis    Post-menopausal        Relevant Orders    DXA bone density spine hip and pelvis    Encounter for screening mammogram for malignant neoplasm of breast        Relevant Orders    Mammo screening bilateral w 3d & cad    Screen for colon cancer        Relevant Orders    Cologuard    Need for hepatitis C screening test        Relevant Orders    Hepatitis C Antibody    Screening for HIV (human immunodeficiency virus)        Relevant Orders    HIV 1/2 AG/AB w Reflex [facility] for 2 yr old and above    Screening for diabetes mellitus        Relevant Orders    Comprehensive metabolic panel    Screening, lipid        Relevant Orders    Lipid panel    Screening for thyroid disorder        Relevant Orders    TSH, 3rd generation with Free T4 reflex    Screening, anemia, deficiency, iron        Relevant Orders    CBC and differential            Immunizations and preventive care screenings were discussed with patient today. Appropriate education was printed on patient's after visit summary.    Counseling:  Alcohol/drug use: discussed moderation in alcohol intake, the recommendations for healthy alcohol  use, and avoidance of illicit drug use.  Dental Health: discussed importance of regular tooth brushing, flossing, and dental visits.  Injury prevention: discussed safety/seat belts, safety helmets, smoke detectors, carbon dioxide detectors, and smoking near bedding or upholstery.  Sexual health: discussed sexually transmitted diseases, partner selection, use of condoms, avoidance of unintended pregnancy, and contraceptive alternatives.  Exercise: the importance of regular exercise/physical activity was discussed. Recommend exercise 3-5 times per week for at least 30 minutes.       Depression Screening and Follow-up Plan: Patient was screened for depression during today's encounter. They screened negative with a PHQ-2 score of 0.        Return in about 1 year (around 4/22/2025) for Annual physical.     Chief Complaint:     Chief Complaint   Patient presents with    Establish Care      History of Present Illness:     Adult Annual Physical   Patient here for a comprehensive physical exam. The patient reports no problems.    Diet and Physical Activity  Diet/Nutrition: well balanced diet.   Exercise: walking and moderate cardiovascular exercise.      Depression Screening  PHQ-2/9 Depression Screening    Little interest or pleasure in doing things: 0 - not at all  Feeling down, depressed, or hopeless: 0 - not at all  PHQ-2 Score: 0  PHQ-2 Interpretation: Negative depression screen       General Health  Sleep: sleeps well.   Hearing: normal - bilateral.  Vision: no vision problems, most recent eye exam >1 year ago, and recommend [id number] .   Dental: regular dental visits.     Immunizations: Tdap due 2026; discussed shingles (pt to consider)    /GYN Health  Follows with gynecology? yes   Referral to establish in [facility]  Patient is: post menopausal - [place] 2019 (endometrial cancer)  Still following with Dr. Goetz yearly  Last menstrual period: 2109  Contraceptive method:  no .  Mammo due -  referral in  Encourage monthly self breast exams  Check Dexa - advise to verify insurance coverage   Encourage daily vitamins: calcium 1200 mg/Vitamin D 2000 IU  Advanced Care Planning  Do you have an advanced directive? no  Do you have a durable medical power of ? no  ACP document given to the patient? No  Discuss next visit     Review of Systems:     Review of Systems   Constitutional: Negative.    HENT: Negative.     Eyes: Negative.    Respiratory: Negative.     Cardiovascular: Negative.    Gastrointestinal: Negative.    Genitourinary: Negative.    Musculoskeletal: Negative.    Skin: Negative.    Neurological: Negative.    Psychiatric/Behavioral: Negative.        Past Medical History:     Past Medical History:   Diagnosis Date    Abnormal uterine bleeding     hysterectomy today 12/27/2019    Endometrial cancer (HCC) 2019    Fibroids     uterine    Headache     Motion sickness     Papanicolaou smear 11/26/2019    Wears glasses       Past Surgical History:     Past Surgical History:   Procedure Laterality Date    HYSTERECTOMY N/A 12/27/2019    Procedure: HYSTERECTOMY TOTAL ABDOMINAL ([place]);  Surgeon: Rashaad Farah MD;  Location[facility];  Service: Gynecology Oncology    LAPAROTOMY N/A 12/27/2019    Procedure: LAPAROTOMY EXPLORATORY;  Surgeon: Rashaad Farah MD;  Location: [facility];  Service: Gynecology Oncology    OOPHORECTOMY Bilateral 12/27/2019    WISDOM TOOTH EXTRACTION        Social History:     Social History     Socioeconomic History    Marital status: /Civil Union     Spouse name: None    Number of children: None    Years of education: None    Highest education level: None   Occupational History    Occupation: Mobile therapist/counselor   Tobacco Use    Smoking status: Never    Smokeless tobacco: Never   Vaping Use    Vaping status: Never Used   Substance and Sexual Activity    Alcohol use: Not Currently     Comment: Rarely, 1 or 2 drinks per year    Drug use: Never    Sexual  activity: Yes     Partners: Male     Birth control/protection: None     Comment: post hysterectomy   Other Topics Concern    None   Social History Narrative    Exercise: Never    Domestic violence: No     Social Determinants of Health     Financial Resource Strain: Not on file   Food Insecurity: Not on file   Transportation Needs: Not on file   Physical Activity: Not on file   Stress: Not on file   Social Connections: Not on file   Intimate Partner Violence: Not on file   Housing Stability: Not on file      Family History:     Family History   Problem Relation Age of Onset    No Known Problems Mother     Colon polyps Father     No Known Problems Sister     No Known Problems Maternal Grandmother     No Known Problems Maternal Grandfather     No Known Problems Paternal Grandmother     No Known Problems Paternal Grandfather     Colon cancer Maternal Aunt 40    Colon cancer Maternal Aunt         70's    No Known Problems Maternal Aunt     No Known Problems Maternal Aunt     Cancer Paternal Aunt         UNKNOWN ORIGIN OR AGE    No Known Problems Paternal Aunt     No Known Problems Paternal Aunt     Pancreatic cancer Paternal Uncle         early 70's    Breast cancer Neg Hx     Ovarian cancer Neg Hx       Current Medications:     Current Outpatient Medications   Medication Sig Dispense Refill    acetaminophen (TYLENOL) 500 mg tablet Take 500 mg by mouth every 6 (six) hours as needed for mild pain      Multiple Vitamins-Calcium (ONE-A-DAY WOMENS FORMULA PO) Take by mouth      Probiotic Product (PROBIOTIC + TURMERIC EXTRACT) 400 MG CAPS Take 580 mg by mouth       No current facility-administered medications for this visit.      Allergies:     Allergies   Allergen Reactions    Penicillin V Hives and Rash    Strawberry (Diagnostic) - Food Allergy Hives    Ibuprofen Rash    Melatonin Hives and Rash      Physical Exam:     /86 (BP Location: Left arm, Patient Position: Sitting, Cuff Size: Adult)   Pulse 99   Temp 97.9 °F  "(36.6 °C)   Ht 5' 3\" (1.6 m)   Wt 61.7 kg (136 lb)   LMP 12/13/2019 (Exact Date)   SpO2 99%   BMI 24.09 kg/m²     Physical Exam  Vitals and nursing note reviewed.   Constitutional:       General: She is not in acute distress.     Appearance: Normal appearance. She is well-developed and well-groomed. She is not ill-appearing.   HENT:      Head: Normocephalic.      Right Ear: Tympanic membrane, ear canal and external ear normal.      Left Ear: Tympanic membrane, ear canal and external ear normal.      Nose: Nose normal.      Mouth/Throat:      Mouth: Mucous membranes are moist.      Pharynx: Oropharynx is clear.   Eyes:      Conjunctiva/sclera: Conjunctivae normal.      Pupils: Pupils are equal, round, and reactive to light.   Neck:      Thyroid: No thyromegaly.      Vascular: No carotid bruit.   Cardiovascular:      Rate and Rhythm: Normal rate and regular rhythm.      Pulses:           Posterior tibial pulses are 2+ on the right side and 2+ on the left side.      Heart sounds: Normal heart sounds.   Pulmonary:      Effort: Pulmonary effort is normal. No respiratory distress.      Breath sounds: Normal breath sounds and air entry.   Musculoskeletal:      Right lower leg: No edema.      Left lower leg: No edema.   Lymphadenopathy:      Cervical: No cervical adenopathy.   Skin:     General: Skin is warm and dry.   Neurological:      General: No focal deficit present.      Mental Status: She is alert and oriented to person, place, and time.   Psychiatric:         Attention and Perception: Attention normal.         Mood and Affect: Mood normal.         Behavior: Behavior normal.         Thought Content: Thought content normal.         Judgment: Judgment normal.          LALITO Mcclain  [facility]    "

## 2024-05-15 LAB — COLOGUARD RESULT REPORTABLE: NEGATIVE

## 2024-05-30 ENCOUNTER — OFFICE VISIT (OUTPATIENT)
Age: 54
End: 2024-05-30
Payer: COMMERCIAL

## 2024-05-30 VITALS
HEIGHT: 63 IN | SYSTOLIC BLOOD PRESSURE: 140 MMHG | RESPIRATION RATE: 18 BRPM | WEIGHT: 137.3 LBS | DIASTOLIC BLOOD PRESSURE: 82 MMHG | TEMPERATURE: 98.1 F | BODY MASS INDEX: 24.33 KG/M2 | OXYGEN SATURATION: 98 % | HEART RATE: 75 BPM

## 2024-05-30 DIAGNOSIS — Z08 ENCOUNTER FOR FOLLOW-UP SURVEILLANCE OF ENDOMETRIAL CANCER: Primary | ICD-10-CM

## 2024-05-30 DIAGNOSIS — Z85.42 ENCOUNTER FOR FOLLOW-UP SURVEILLANCE OF ENDOMETRIAL CANCER: Primary | ICD-10-CM

## 2024-05-30 DIAGNOSIS — Z85.42 HISTORY OF ENDOMETRIAL CANCER: ICD-10-CM

## 2024-05-30 PROCEDURE — 99212 OFFICE O/P EST SF 10 MIN: CPT | Performed by: PHYSICIAN ASSISTANT

## 2024-05-30 NOTE — ASSESSMENT & PLAN NOTE
History of stage IA, grade 1 endometrial cancer s/p surgical resection in December 2019. She is clinically without evidence of disease recurrence.     Return to the office prn. She will f/u with routine gynecology annually.     Encouraged patient to scheduled DEXA and mammogram.

## 2024-05-30 NOTE — PROGRESS NOTES
Assessment/Plan:    Problem List Items Addressed This Visit          Oncology    History of endometrial cancer     History of stage IA, grade 1 endometrial cancer s/p surgical resection in December 2019. She is clinically without evidence of disease recurrence.     Return to the office prn. She will f/u with routine gynecology annually.     Encouraged patient to scheduled DEXA and mammogram.          Encounter for follow-up surveillance of endometrial cancer - Primary         CHIEF COMPLAINT:   Endometrial cancer surveillance    Problem:  Cancer Staging   History of endometrial cancer  Staging form: Corpus Uteri - Carcinoma, AJCC 8th Edition  - Clinical stage from 1/14/2020: FIGO Stage I (cT1, cN0, cM0) - Signed by Rashaad Farah MD on 1/14/2020  - Pathologic stage from 1/14/2020: FIGO Stage IA (pT1a, pN0, cM0) - Signed by Rashaad Farah MD on 1/14/2020        Previous therapy:  Oncology History   History of endometrial cancer   12/27/2019 Surgery    Total abdominal hysterectomy bilateral salpingo-oophorectomy for stage IA grade 1 noninvasive endometrial adenocarcinoma.  No further treatment required.  Gorman syndrome negative     1/14/2020 Initial Diagnosis    Endometrial cancer (HCC)     1/14/2020 -  Cancer Staged    Staging form: Corpus Uteri - Carcinoma, AJCC 8th Edition  - Pathologic stage from 1/14/2020: FIGO Stage IA (pT1a, pN0, cM0) - Signed by Rashaad Farah MD on 1/14/2020  Stage used in treatment planning: Yes  National guidelines used in treatment planning: Yes       1/14/2020 -  Cancer Staged    Staging form: Corpus Uteri - Carcinoma, AJCC 8th Edition  - Clinical stage from 1/14/2020: FIGO Stage I (cT1, cN0, cM0) - Signed by Rashaad Farah MD on 1/14/2020  Histologic grade (G): G1  Histologic grading system: 3 grade system  Stage used in treatment planning: Yes  National guidelines used in treatment planning: Yes             Patient ID: Willow Vogt is a 54 y.o. female  who has no new  "complaints today. No vaginal bleeding, abdominal/pelvic pain. Normal bowel and bladder function. No interval change in medical history since last visit. Quality of life is good.        The following portions of the patient's history were reviewed and updated as appropriate: allergies, current medications, past medical history, past surgical history, and problem list.    Review of Systems   Constitutional: Negative.    HENT: Negative.     Eyes: Negative.    Respiratory: Negative.     Cardiovascular: Negative.    Gastrointestinal: Negative.    Genitourinary: Negative.    Musculoskeletal: Negative.    Skin: Negative.    Neurological: Negative.    Psychiatric/Behavioral: Negative.         Current Outpatient Medications   Medication Sig Dispense Refill    acetaminophen (TYLENOL) 500 mg tablet Take 500 mg by mouth every 6 (six) hours as needed for mild pain      Multiple Vitamins-Calcium (ONE-A-DAY WOMENS FORMULA PO) Take by mouth      Probiotic Product (PROBIOTIC + TURMERIC EXTRACT) 400 MG CAPS Take 580 mg by mouth       No current facility-administered medications for this visit.           Objective:    Blood pressure 140/82, pulse 75, temperature 98.1 °F (36.7 °C), resp. rate 18, height 5' 3\" (1.6 m), weight 62.3 kg (137 lb 4.8 oz), last menstrual period 12/13/2019, SpO2 98%.  Body mass index is 24.32 kg/m².  Body surface area is 1.65 meters squared.    Physical Exam  Vitals reviewed. Exam conducted with a chaperone present.   Constitutional:       General: She is not in acute distress.     Appearance: Normal appearance. She is not ill-appearing.   HENT:      Head: Normocephalic and atraumatic.      Mouth/Throat:      Mouth: Mucous membranes are moist.   Eyes:      General:         Right eye: No discharge.         Left eye: No discharge.      Conjunctiva/sclera: Conjunctivae normal.   Pulmonary:      Effort: Pulmonary effort is normal.   Abdominal:      Palpations: Abdomen is soft. There is no mass.      Tenderness: " There is no abdominal tenderness.      Hernia: No hernia is present.   Genitourinary:     Comments: The external female genitalia is normal. The bartholin's, uretheral and skenes glands are normal. The urethral meatus is normal (midline with no lesions). Anus without fissure or lesion. Speculum exam reveals a grossly normal vagina. No masses, lesions,discharge or bleeding. No significant cystocele or rectocele noted. Bimanual exam notes a surgical absent cervix, uterus and adnexal structures. No masses or fullness. Bladder is without fullness, mass or tenderness.  Musculoskeletal:      Right lower leg: No edema.      Left lower leg: No edema.   Skin:     General: Skin is warm and dry.      Coloration: Skin is not jaundiced.      Findings: No rash.   Neurological:      General: No focal deficit present.      Mental Status: She is alert and oriented to person, place, and time.      Cranial Nerves: No cranial nerve deficit.      Sensory: No sensory deficit.      Motor: No weakness.      Gait: Gait normal.   Psychiatric:         Mood and Affect: Mood normal.         Behavior: Behavior normal.         Thought Content: Thought content normal.         Judgment: Judgment normal.

## 2024-08-20 ENCOUNTER — TELEPHONE (OUTPATIENT)
Age: 54
End: 2024-08-20

## 2024-08-20 ENCOUNTER — OFFICE VISIT (OUTPATIENT)
Dept: URGENT CARE | Facility: CLINIC | Age: 54
End: 2024-08-20
Payer: COMMERCIAL

## 2024-08-20 VITALS
BODY MASS INDEX: 24.52 KG/M2 | WEIGHT: 138.4 LBS | TEMPERATURE: 98.4 F | HEART RATE: 70 BPM | OXYGEN SATURATION: 99 % | DIASTOLIC BLOOD PRESSURE: 85 MMHG | SYSTOLIC BLOOD PRESSURE: 177 MMHG | RESPIRATION RATE: 18 BRPM

## 2024-08-20 DIAGNOSIS — R31.9 URINARY TRACT INFECTION WITH HEMATURIA, SITE UNSPECIFIED: Primary | ICD-10-CM

## 2024-08-20 DIAGNOSIS — N39.0 URINARY TRACT INFECTION WITH HEMATURIA, SITE UNSPECIFIED: Primary | ICD-10-CM

## 2024-08-20 LAB
SL AMB  POCT GLUCOSE, UA: NEGATIVE
SL AMB LEUKOCYTE ESTERASE,UA: ABNORMAL
SL AMB POCT BILIRUBIN,UA: NEGATIVE
SL AMB POCT BLOOD,UA: ABNORMAL
SL AMB POCT CLARITY,UA: CLEAR
SL AMB POCT COLOR,UA: YELLOW
SL AMB POCT KETONES,UA: NEGATIVE
SL AMB POCT NITRITE,UA: NEGATIVE
SL AMB POCT PH,UA: 6
SL AMB POCT SPECIFIC GRAVITY,UA: 1.01
SL AMB POCT URINE PROTEIN: NEGATIVE
SL AMB POCT UROBILINOGEN: 0.2

## 2024-08-20 PROCEDURE — G0382 LEV 3 HOSP TYPE B ED VISIT: HCPCS | Performed by: PHYSICIAN ASSISTANT

## 2024-08-20 PROCEDURE — 87086 URINE CULTURE/COLONY COUNT: CPT | Performed by: PHYSICIAN ASSISTANT

## 2024-08-20 PROCEDURE — S9083 URGENT CARE CENTER GLOBAL: HCPCS | Performed by: PHYSICIAN ASSISTANT

## 2024-08-20 PROCEDURE — 81002 URINALYSIS NONAUTO W/O SCOPE: CPT | Performed by: PHYSICIAN ASSISTANT

## 2024-08-20 RX ORDER — NITROFURANTOIN 25; 75 MG/1; MG/1
100 CAPSULE ORAL 2 TIMES DAILY
Qty: 10 CAPSULE | Refills: 0 | Status: SHIPPED | OUTPATIENT
Start: 2024-08-20 | End: 2024-08-25

## 2024-08-20 NOTE — PROGRESS NOTES
[facility]    NAME: Willow Vogt is a 54 y.o. female  : 1970    MRN: [id number]  DATE: 2024  TIME: 6:36 PM    Assessment and Plan   Urinary tract infection with hematuria, site unspecified [N39.0, R31.9]  1. Urinary tract infection with hematuria, site unspecified  POCT urine dip    Urine culture    nitrofurantoin (MACROBID) 100 mg capsule          Patient Instructions     Patient Instructions   Take antibiotic as directed.    Push fluids.    If burning on urination, you may try over the counter Azo Urinary Pain Relief or comparable product.  Please note that this type of product may cause your urine to become bright orange and it may stain your undergarments if you leak.  Please wear protection as needed.      Schedule follow up with your PCP if not improving over next 3-5 days.    If you have recurrent urinary tract problems, please follow up with your PCP and / or urologist for further evaluation.      If you develop worsening symptoms with associated fever, back pain, abdominal pain, nausea with vomiting, please proceed to emergency room for further evaluation.           Chief Complaint     Chief Complaint   Patient presents with    Urinary Frequency     Frequency in urination, burning sensation when urinating, and a spot of blood in urine that began today.        History of Present Illness   Willow Vogt presents to the clinic c/o  54-year-old female comes in with complaint of urinary tract problems.    Called her primary care office earlier today saying she was having burning on urination and small amount of blood.    Started: Today while sitting in a meeting.  Associated signs and symptoms: Has felt a little chilled today but no fever.  Burns when urinating.  Not sure about any frequency at this point.  Did have a little bit of blood on toilet paper with wiping after urination.  Modifying factors: Drinking fluids.    Last UTI approximately 15 years ago.    No longer has female  organs.          Review of Systems   Review of Systems   Constitutional: Negative.    Respiratory: Negative.     Cardiovascular: Negative.    Gastrointestinal:  Positive for abdominal pain. Negative for nausea and vomiting.   Genitourinary:  Positive for difficulty urinating, dysuria and hematuria. Negative for flank pain, frequency and urgency.       Current Medications     No long-term medications on file.       Current Allergies     Allergies as of 08/20/2024 - Reviewed 08/20/2024   Allergen Reaction Noted    Penicillin v Hives and Rash 08/06/2018    [place] (diagnostic) - food allergy Hives 09/07/2021    Ibuprofen Rash 08/06/2018    Melatonin Hives and Rash 01/15/2021          The following portions of the patient's history were reviewed and updated as appropriate: allergies, current medications, past family history, past medical history, past social history, past surgical history and problem list.  Past Medical History:   Diagnosis Date    Abnormal uterine bleeding     hysterectomy today 12/27/2019    Endometrial cancer (HCC) 2019    Fibroids     uterine    Headache     Motion sickness     Papanicolaou smear 11/26/2019    Wears glasses      Past Surgical History:   Procedure Laterality Date    HYSTERECTOMY N/A 12/27/2019    Procedure: HYSTERECTOMY TOTAL ABDOMINAL ([place]);  Surgeon: Rashaad Farah MD;  [facility];  Service: Gynecology Oncology    LAPAROTOMY N/A 12/27/2019    Procedure: LAPAROTOMY EXPLORATORY;  Surgeon: Rashaad Farah MD;  [facility];  Service: Gynecology Oncology    OOPHORECTOMY Bilateral 12/27/2019    WISDOM TOOTH EXTRACTION       Family History   Problem Relation Age of Onset    No Known Problems Mother     Colon polyps Father     No Known Problems Sister     No Known Problems Maternal Grandmother     No Known Problems Maternal Grandfather     No Known Problems Paternal Grandmother     No Known Problems Paternal Grandfather     Colon cancer Maternal Aunt 40    Colon  cancer Maternal Aunt         70's    No Known Problems Maternal Aunt     No Known Problems Maternal Aunt     Cancer Paternal Aunt         UNKNOWN ORIGIN OR AGE    No Known Problems Paternal Aunt     No Known Problems Paternal Aunt     Pancreatic cancer Paternal Uncle         early 70's    Breast cancer Neg Hx     Ovarian cancer Neg Hx        Objective   BP (!) 177/85   Pulse 70   Temp 98.4 °F (36.9 °C)   Resp 18   Wt 62.8 kg (138 lb 6.4 oz)   LMP 12/13/2019 (Exact Date)   SpO2 99%   BMI 24.52 kg/m²   Patient's last menstrual period was 12/13/2019 (exact date).       Physical Exam     Physical Exam  Vitals and nursing note reviewed.   Constitutional:       General: She is not in acute distress.     Appearance: She is well-developed. She is not ill-appearing, toxic-appearing or diaphoretic.   Cardiovascular:      Rate and Rhythm: Normal rate and regular rhythm.      Heart sounds: Normal heart sounds. No murmur heard.     No friction rub. No gallop.   Pulmonary:      Effort: Pulmonary effort is normal. No respiratory distress.      Breath sounds: Normal breath sounds. No stridor. No wheezing, rhonchi or rales.   Abdominal:      Palpations: Abdomen is soft.      Tenderness: There is abdominal tenderness. There is no right CVA tenderness, left CVA tenderness, guarding or rebound.      Comments: Mild suprapubic TTP   Skin:     General: Skin is warm and dry.   Neurological:      General: No focal deficit present.      Mental Status: She is alert and oriented to person, place, and time.   Psychiatric:         Mood and Affect: Mood normal.         Behavior: Behavior normal.

## 2024-08-20 NOTE — TELEPHONE ENCOUNTER
Patient reports she is having burning with urination and small amount of blood in her urine today. No fever, urgency or frequency. First open appt is Thursday. Patient does not want to wait that long, is going to [facility] this evening.

## 2024-08-23 LAB — BACTERIA UR CULT: NORMAL

## 2025-02-03 ENCOUNTER — OFFICE VISIT (OUTPATIENT)
Dept: FAMILY MEDICINE CLINIC | Facility: CLINIC | Age: 55
End: 2025-02-03
Payer: COMMERCIAL

## 2025-02-03 VITALS
HEIGHT: 63 IN | DIASTOLIC BLOOD PRESSURE: 90 MMHG | HEART RATE: 85 BPM | BODY MASS INDEX: 25.05 KG/M2 | OXYGEN SATURATION: 97 % | SYSTOLIC BLOOD PRESSURE: 152 MMHG | WEIGHT: 141.4 LBS | TEMPERATURE: 98.3 F

## 2025-02-03 DIAGNOSIS — Z13.1 SCREENING FOR DIABETES MELLITUS: ICD-10-CM

## 2025-02-03 DIAGNOSIS — Z13.0 SCREENING, ANEMIA, DEFICIENCY, IRON: ICD-10-CM

## 2025-02-03 DIAGNOSIS — R10.84 GENERALIZED ABDOMINAL PAIN: Primary | ICD-10-CM

## 2025-02-03 DIAGNOSIS — Z13.29 SCREENING FOR THYROID DISORDER: ICD-10-CM

## 2025-02-03 DIAGNOSIS — K21.9 GASTROESOPHAGEAL REFLUX DISEASE, UNSPECIFIED WHETHER ESOPHAGITIS PRESENT: ICD-10-CM

## 2025-02-03 DIAGNOSIS — Z13.220 SCREENING, LIPID: ICD-10-CM

## 2025-02-03 DIAGNOSIS — Z85.42 HISTORY OF ENDOMETRIAL CANCER: ICD-10-CM

## 2025-02-03 DIAGNOSIS — Z11.4 SCREENING FOR HIV (HUMAN IMMUNODEFICIENCY VIRUS): ICD-10-CM

## 2025-02-03 DIAGNOSIS — R31.29 MICROSCOPIC HEMATURIA: ICD-10-CM

## 2025-02-03 DIAGNOSIS — Z11.59 NEED FOR HEPATITIS C SCREENING TEST: ICD-10-CM

## 2025-02-03 LAB
SL AMB  POCT GLUCOSE, UA: NEGATIVE
SL AMB LEUKOCYTE ESTERASE,UA: NEGATIVE
SL AMB POCT BILIRUBIN,UA: NEGATIVE
SL AMB POCT BLOOD,UA: ABNORMAL
SL AMB POCT CLARITY,UA: CLEAR
SL AMB POCT COLOR,UA: YELLOW
SL AMB POCT KETONES,UA: NEGATIVE
SL AMB POCT NITRITE,UA: NEGATIVE
SL AMB POCT PH,UA: 6.5
SL AMB POCT SPECIFIC GRAVITY,UA: 1.02
SL AMB POCT URINE PROTEIN: NEGATIVE
SL AMB POCT UROBILINOGEN: 0.2

## 2025-02-03 PROCEDURE — 87086 URINE CULTURE/COLONY COUNT: CPT | Performed by: NURSE PRACTITIONER

## 2025-02-03 PROCEDURE — 81003 URINALYSIS AUTO W/O SCOPE: CPT | Performed by: NURSE PRACTITIONER

## 2025-02-03 PROCEDURE — 99214 OFFICE O/P EST MOD 30 MIN: CPT | Performed by: NURSE PRACTITIONER

## 2025-02-03 NOTE — PROGRESS NOTES
Name: Willow Vogt      : 1970      MRN: [id number]  Encounter Provider: LALITO Mcclain  Encounter Date: 2/3/2025   Encounter department: [facility]  :  Assessment & Plan  Generalized abdominal pain  Unclear cause of intermittent/recurrent abd pain  Physical assessment today unremarkable  Urine dip notes blood and leukocytes (note microscopic hematuria and urine dip 2024 as well)  Given her vague symptoms and medical history - will check imaging today  Imaging: Abdominal/pelvis CT  Labs : CBC, CMP, urine culture  F/U once imaging completed  Note routine/preventative lab work ordered to complete as well  ER precaution for acute change prior to follow-up      Orders:    CT abdomen pelvis wo contrast; Future    POCT urine dip auto non-scope    CBC and differential; Future    Comprehensive metabolic panel; Future    Urine culture    Need for hepatitis C screening test    Orders:    Hepatitis C Antibody; Future    History of endometrial cancer         Screening, anemia, deficiency, iron    Orders:    CBC and differential; Future    Screening for thyroid disorder    Orders:    TSH, 3rd generation with Free T4 reflex; Future    Screening, lipid    Orders:    Lipid panel; Future    Screening for diabetes mellitus    Orders:    Comprehensive metabolic panel; Future    Screening for HIV (human immunodeficiency virus)    Orders:    HIV 1/2 AG/AB w Reflex [facility] for 2 yr old and above; Future    Microscopic hematuria    Orders:    CT abdomen pelvis wo contrast; Future    POCT urine dip auto non-scope    Urine culture    Gastroesophageal reflux disease, unspecified whether esophagitis present  Describes GERD like symptoms-intermittent  Able to identify food triggers  Discussed PPI/H2 blocker  Patient would prefer to avoid medication if possible with the exception of occasional Tums  She will monitor her symptoms-to assess for further triggers  We will reevaluate at her follow-up once lab  "work and imaging are completed  Discussed possible referral to GI if symptoms persist               Depression Screening and Follow-up Plan: Patient was screened for depression during today's encounter. They screened negative with a PHQ-2 score of 0.      History of Present Illness   Acute visit  Presents with abdominal complaint.  Reports wall discomfort and itching along incision. Surgical history - [facility] (12/2019). (Discharged from oncology in May - now with routine Gyn follow up - next scheduled for June 2025). Notes  intermittent, lower abdominal pain.  Describes it as achy.  Nonspecific location-reports center but then also can radiate bilaterally.  She feels it may be bladder related, however denies any urinary symptoms.  Was seen at urgent care in August for similar lower abd pain. Urine dip with blood and Leuks. Culture mixed contaminants only. Denies any pain or burning with urination today.  Denies any bowel change.  Additionally endorses increased reflux symptoms. Tums resolves. Does note worse with acidic foods. Notes increased frequency of symptoms over the last 2 months. Feels increased abdominal weight. Has been less active - job is more sedentary.       Review of Systems   Constitutional: Negative.    HENT: Negative.     Respiratory: Negative.     Cardiovascular: Negative.    Gastrointestinal:  Positive for abdominal pain. Negative for constipation, diarrhea, nausea and vomiting.        Reflux symptoms-periodically   Neurological: Negative.        Objective   /90 (BP Location: Left arm, Patient Position: Sitting, Cuff Size: Adult)   Pulse 85   Temp 98.3 °F (36.8 °C)   Ht 5' 3\" (1.6 m)   Wt 64.1 kg (141 lb 6.4 oz)   LMP 12/13/2019 (Exact Date)   SpO2 97%   BMI 25.05 kg/m²      Physical Exam  Vitals and nursing note reviewed.   Constitutional:       General: She is not in acute distress.     Appearance: Normal appearance. She is well-developed. She is not ill-appearing.   Cardiovascular:    "   Rate and Rhythm: Normal rate and regular rhythm.   Pulmonary:      Effort: Pulmonary effort is normal. No respiratory distress.      Breath sounds: Normal breath sounds and air entry.   Abdominal:      General: Bowel sounds are normal.      Palpations: Abdomen is soft.      Tenderness: There is no abdominal tenderness. There is no guarding or rebound.   Skin:     General: Skin is warm and dry.   Neurological:      General: No focal deficit present.      Mental Status: She is alert and oriented to person, place, and time.   Psychiatric:         Attention and Perception: Attention normal.         Mood and Affect: Mood normal.         Behavior: Behavior normal.         Thought Content: Thought content normal.         Judgment: Judgment normal.

## 2025-02-05 ENCOUNTER — RESULTS FOLLOW-UP (OUTPATIENT)
Dept: FAMILY MEDICINE CLINIC | Facility: CLINIC | Age: 55
End: 2025-02-05

## 2025-02-05 LAB — BACTERIA UR CULT: NORMAL

## 2025-02-17 ENCOUNTER — APPOINTMENT (OUTPATIENT)
Dept: LAB | Facility: CLINIC | Age: 55
End: 2025-02-17
Payer: COMMERCIAL

## 2025-02-17 DIAGNOSIS — Z13.0 SCREENING, ANEMIA, DEFICIENCY, IRON: ICD-10-CM

## 2025-02-17 DIAGNOSIS — Z13.29 SCREENING FOR THYROID DISORDER: ICD-10-CM

## 2025-02-17 DIAGNOSIS — Z11.59 NEED FOR HEPATITIS C SCREENING TEST: ICD-10-CM

## 2025-02-17 DIAGNOSIS — Z13.220 SCREENING, LIPID: ICD-10-CM

## 2025-02-17 DIAGNOSIS — R10.84 GENERALIZED ABDOMINAL PAIN: ICD-10-CM

## 2025-02-17 DIAGNOSIS — Z13.1 SCREENING FOR DIABETES MELLITUS: ICD-10-CM

## 2025-02-17 DIAGNOSIS — Z11.4 SCREENING FOR HIV (HUMAN IMMUNODEFICIENCY VIRUS): ICD-10-CM

## 2025-02-17 LAB
ALBUMIN SERPL BCG-MCNC: 4.1 G/DL (ref 3.5–5)
ALP SERPL-CCNC: 67 U/L (ref 34–104)
ALT SERPL W P-5'-P-CCNC: 14 U/L (ref 7–52)
ANION GAP SERPL CALCULATED.3IONS-SCNC: 12 MMOL/L (ref 4–13)
AST SERPL W P-5'-P-CCNC: 17 U/L (ref 13–39)
BASOPHILS # BLD AUTO: 0.02 THOUSANDS/ΜL (ref 0–0.1)
BASOPHILS NFR BLD AUTO: 0 % (ref 0–1)
BILIRUB SERPL-MCNC: 0.64 MG/DL (ref 0.2–1)
BUN SERPL-MCNC: 10 MG/DL (ref 5–25)
CALCIUM SERPL-MCNC: 9.6 MG/DL (ref 8.4–10.2)
CHLORIDE SERPL-SCNC: 103 MMOL/L (ref 96–108)
CHOLEST SERPL-MCNC: 173 MG/DL (ref ?–200)
CO2 SERPL-SCNC: 26 MMOL/L (ref 21–32)
CREAT SERPL-MCNC: 0.66 MG/DL (ref 0.6–1.3)
EOSINOPHIL # BLD AUTO: 0.2 THOUSAND/ΜL (ref 0–0.61)
EOSINOPHIL NFR BLD AUTO: 3 % (ref 0–6)
ERYTHROCYTE [DISTWIDTH] IN BLOOD BY AUTOMATED COUNT: 11.7 % (ref 11.6–15.1)
GFR SERPL CREATININE-BSD FRML MDRD: 99 ML/MIN/1.73SQ M
GLUCOSE P FAST SERPL-MCNC: 93 MG/DL (ref 65–99)
HCT VFR BLD AUTO: 44 % (ref 34.8–46.1)
HDLC SERPL-MCNC: 49 MG/DL
HGB BLD-MCNC: 14 G/DL (ref 11.5–15.4)
IMM GRANULOCYTES # BLD AUTO: 0.04 THOUSAND/UL (ref 0–0.2)
IMM GRANULOCYTES NFR BLD AUTO: 1 % (ref 0–2)
LDLC SERPL CALC-MCNC: 91 MG/DL (ref 0–100)
LYMPHOCYTES # BLD AUTO: 1.59 THOUSANDS/ΜL (ref 0.6–4.47)
LYMPHOCYTES NFR BLD AUTO: 24 % (ref 14–44)
MCH RBC QN AUTO: 28.5 PG (ref 26.8–34.3)
MCHC RBC AUTO-ENTMCNC: 31.8 G/DL (ref 31.4–37.4)
MCV RBC AUTO: 89 FL (ref 82–98)
MONOCYTES # BLD AUTO: 0.44 THOUSAND/ΜL (ref 0.17–1.22)
MONOCYTES NFR BLD AUTO: 7 % (ref 4–12)
NEUTROPHILS # BLD AUTO: 4.34 THOUSANDS/ΜL (ref 1.85–7.62)
NEUTS SEG NFR BLD AUTO: 65 % (ref 43–75)
NONHDLC SERPL-MCNC: 124 MG/DL
NRBC BLD AUTO-RTO: 0 /100 WBCS
PLATELET # BLD AUTO: 258 THOUSANDS/UL (ref 149–390)
PMV BLD AUTO: 9.3 FL (ref 8.9–12.7)
POTASSIUM SERPL-SCNC: 4.6 MMOL/L (ref 3.5–5.3)
PROT SERPL-MCNC: 7 G/DL (ref 6.4–8.4)
RBC # BLD AUTO: 4.92 MILLION/UL (ref 3.81–5.12)
SODIUM SERPL-SCNC: 141 MMOL/L (ref 135–147)
TRIGL SERPL-MCNC: 165 MG/DL (ref ?–150)
TSH SERPL DL<=0.05 MIU/L-ACNC: 4.35 UIU/ML (ref 0.45–4.5)
WBC # BLD AUTO: 6.63 THOUSAND/UL (ref 4.31–10.16)

## 2025-02-17 PROCEDURE — 87389 HIV-1 AG W/HIV-1&-2 AB AG IA: CPT

## 2025-02-17 PROCEDURE — 84443 ASSAY THYROID STIM HORMONE: CPT

## 2025-02-17 PROCEDURE — 86803 HEPATITIS C AB TEST: CPT

## 2025-02-17 PROCEDURE — 85025 COMPLETE CBC W/AUTO DIFF WBC: CPT

## 2025-02-17 PROCEDURE — 80061 LIPID PANEL: CPT

## 2025-02-17 PROCEDURE — 36415 COLL VENOUS BLD VENIPUNCTURE: CPT

## 2025-02-17 PROCEDURE — 80053 COMPREHEN METABOLIC PANEL: CPT

## 2025-02-18 LAB
HCV AB SER QL: NORMAL
HIV 1+2 AB+HIV1 P24 AG SERPL QL IA: NORMAL

## 2025-02-19 ENCOUNTER — HOSPITAL ENCOUNTER (OUTPATIENT)
Dept: CT IMAGING | Facility: HOSPITAL | Age: 55
Discharge: HOME | End: 2025-02-19
Payer: COMMERCIAL

## 2025-02-19 DIAGNOSIS — R31.29 MICROSCOPIC HEMATURIA: ICD-10-CM

## 2025-02-19 DIAGNOSIS — R10.84 GENERALIZED ABDOMINAL PAIN: ICD-10-CM

## 2025-02-19 PROCEDURE — 74176 CT ABD & PELVIS W/O CONTRAST: CPT

## 2025-02-25 DIAGNOSIS — R10.84 GENERALIZED ABDOMINAL PAIN: Primary | ICD-10-CM

## 2025-02-25 DIAGNOSIS — R31.29 MICROSCOPIC HEMATURIA: ICD-10-CM

## 2025-03-03 NOTE — PROGRESS NOTES
Name: Willow Vogt      : 1970      MRN: [id number]  Encounter Provider: LALITO Blank  Encounter Date: 3/6/2025   [facility]  :  Assessment & Plan  Generalized abdominal pain  Unclear etiology CT scan showed no abnormalities.  Orders:    Ambulatory Referral to Urology    Microscopic hematuria  Discussed workup for microscopic hematuria. UA , cytology ordered.  History of endometrial cancer in 2020.  Patient notes having gross hematuria a few times.  CT scan showed no acute findings to explain hematuria.  Cytology ordered as well as urinalysis.  Did tell patient I will follow-up once resulted.  Discussed cystoscopy as well.PVR: 12ml  UA: showed small amount of blood,  Orders:    POCT Measure PVR    Ambulatory Referral to Urology    Cytology, urine    POCT urine dip auto non-scope    Urinalysis with microscopic        History of Present Illness   Willow Vogt is a 54 y.o. female who presents as a new patent.      Patient reports she has had urine that is pinkish at times.  Denies dysuria, urgency, frequency, no history of kidney stones.  CT scan 2025 with no findings.  Patient also had 2 urine cultures that were negative for UTIs.  Patient has history of endometrial cancer in       Review of Systems   Constitutional:  Negative for chills and fever.   HENT:  Negative for ear pain and sore throat.    Eyes:  Negative for pain and visual disturbance.   Respiratory:  Negative for cough and shortness of breath.    Cardiovascular:  Negative for chest pain and palpitations.   Gastrointestinal:  Negative for abdominal pain and vomiting.   Genitourinary:  Negative for dysuria, enuresis, flank pain, frequency, hematuria, menstrual problem, pelvic pain and urgency.   Musculoskeletal:  Negative for arthralgias and back pain.   Skin:  Negative for color change and rash.   Neurological:  Negative for seizures and syncope.   All other systems reviewed and are  "negative.         Objective   /70 (BP Location: Left arm, Patient Position: Sitting, Cuff Size: Standard)   Pulse 76   Ht 5' 3\" (1.6 m)   Wt 63 kg (139 lb)   LMP 12/13/2019 (Exact Date)   SpO2 98%   BMI 24.62 kg/m²     Physical Exam  Vitals and nursing note reviewed.   Constitutional:       General: She is not in acute distress.     Appearance: She is well-developed.   HENT:      Head: Normocephalic and atraumatic.   Eyes:      Conjunctiva/sclera: Conjunctivae normal.   Cardiovascular:      Rate and Rhythm: Normal rate and regular rhythm.      Heart sounds: No murmur heard.  Pulmonary:      Effort: Pulmonary effort is normal. No respiratory distress.      Breath sounds: Normal breath sounds.   Abdominal:      Palpations: Abdomen is soft.      Tenderness: There is no abdominal tenderness.   Musculoskeletal:         General: No swelling.      Cervical back: Neck supple.   Skin:     General: Skin is warm and dry.      Capillary Refill: Capillary refill takes less than 2 seconds.   Neurological:      Mental Status: She is alert.   Psychiatric:         Mood and Affect: Mood normal.           Results   No results found for: \"PSA\"  Lab Results   Component Value Date    CALCIUM 9.6 02/17/2025    K 4.6 02/17/2025    CO2 26 02/17/2025     02/17/2025    BUN 10 02/17/2025    CREATININE 0.66 02/17/2025     Lab Results   Component Value Date    WBC 6.63 02/17/2025    HGB 14.0 02/17/2025    HCT 44.0 02/17/2025    MCV 89 02/17/2025     02/17/2025       Office Urine Dip  Recent Results (from the past hour)   POCT Measure PVR    Collection Time: 03/06/25  8:12 AM   Result Value Ref Range    POST-VOID RESIDUAL VOLUME, ML POC 12 mL         "

## 2025-03-06 ENCOUNTER — OFFICE VISIT (OUTPATIENT)
Dept: UROLOGY | Facility: MEDICAL CENTER | Age: 55
End: 2025-03-06
Payer: COMMERCIAL

## 2025-03-06 VITALS
HEIGHT: 63 IN | SYSTOLIC BLOOD PRESSURE: 120 MMHG | BODY MASS INDEX: 24.63 KG/M2 | HEART RATE: 76 BPM | DIASTOLIC BLOOD PRESSURE: 70 MMHG | OXYGEN SATURATION: 98 % | WEIGHT: 139 LBS

## 2025-03-06 DIAGNOSIS — R10.84 GENERALIZED ABDOMINAL PAIN: ICD-10-CM

## 2025-03-06 DIAGNOSIS — R31.29 MICROSCOPIC HEMATURIA: ICD-10-CM

## 2025-03-06 LAB
POST-VOID RESIDUAL VOLUME, ML POC: 12 ML
SL AMB  POCT GLUCOSE, UA: NORMAL
SL AMB LEUKOCYTE ESTERASE,UA: NORMAL
SL AMB POCT BILIRUBIN,UA: NORMAL
SL AMB POCT BLOOD,UA: NORMAL
SL AMB POCT CLARITY,UA: CLEAR
SL AMB POCT COLOR,UA: YELLOW
SL AMB POCT KETONES,UA: NORMAL
SL AMB POCT NITRITE,UA: NORMAL
SL AMB POCT PH,UA: 5.5
SL AMB POCT SPECIFIC GRAVITY,UA: 1.01
SL AMB POCT URINE PROTEIN: NORMAL
SL AMB POCT UROBILINOGEN: 0.2

## 2025-03-06 PROCEDURE — 99203 OFFICE O/P NEW LOW 30 MIN: CPT

## 2025-03-06 PROCEDURE — 51798 US URINE CAPACITY MEASURE: CPT

## 2025-03-06 PROCEDURE — 81003 URINALYSIS AUTO W/O SCOPE: CPT

## 2025-03-06 RX ORDER — DIPHENHYDRAMINE HCL 25 MG
25 CAPSULE ORAL AS NEEDED
COMMUNITY

## 2025-03-07 LAB
APPEARANCE UR: CLEAR
BACTERIA URNS QL MICRO: NORMAL
BILIRUB UR QL STRIP: NEGATIVE
CASTS URNS QL MICRO: NORMAL /LPF
COLOR UR: YELLOW
EPI CELLS #/AREA URNS HPF: NORMAL /HPF (ref 0–10)
GLUCOSE UR QL: NEGATIVE
HGB UR QL STRIP: NEGATIVE
KETONES UR QL STRIP: NEGATIVE
LEUKOCYTE ESTERASE UR QL STRIP: NEGATIVE
MICRO URNS: NORMAL
MICRO URNS: NORMAL
NITRITE UR QL STRIP: NEGATIVE
PH UR STRIP: 6 [PH] (ref 5–7.5)
PROT UR QL STRIP: NEGATIVE
RBC #/AREA URNS HPF: NORMAL /HPF (ref 0–2)
SP GR UR: 1.01 (ref 1–1.03)
UROBILINOGEN UR STRIP-ACNC: 0.2 MG/DL (ref 0.2–1)
WBC #/AREA URNS HPF: NORMAL /HPF (ref 0–5)

## 2025-03-10 LAB
COUNSELING NOTE: NORMAL
CYTOLOGIST CVX/VAG CYTO: NORMAL
DX ICD CODE: NORMAL
PATH REPORT.FINAL DX SPEC: NORMAL
PATH REPORT.GROSS SPEC: NORMAL
PATH REPORT.SITE OF ORIGIN SPEC: NORMAL
PATHOLOGIST NAME: NORMAL

## 2025-03-11 ENCOUNTER — RESULTS FOLLOW-UP (OUTPATIENT)
Dept: UROLOGY | Facility: MEDICAL CENTER | Age: 55
End: 2025-03-11

## 2025-03-11 NOTE — TELEPHONE ENCOUNTER
LVM informing patient of negative urinalysis and cytology.  I did tell patient Alondra recommends proceeding with an in office cystoscopy.    I provided patient with office #[phone] to schedule this.

## 2025-03-19 NOTE — TELEPHONE ENCOUNTER
Pt called and scheduled cysto on 4/22 at 1:30 with Dr. CORTÉS     Pt requested a call back to check if INS will cover cysto and what would be the percentage.     Please review

## 2025-03-20 NOTE — TELEPHONE ENCOUNTER
ALEXAM telling patient she would need to call her ins company to see if the cysto is covered or not.  I informed her their phone # should be on the back of her ins card.

## 2025-04-22 ENCOUNTER — PROCEDURE VISIT (OUTPATIENT)
Dept: UROLOGY | Facility: MEDICAL CENTER | Age: 55
End: 2025-04-22
Payer: COMMERCIAL

## 2025-04-22 VITALS
WEIGHT: 138.2 LBS | DIASTOLIC BLOOD PRESSURE: 90 MMHG | HEART RATE: 78 BPM | OXYGEN SATURATION: 97 % | SYSTOLIC BLOOD PRESSURE: 142 MMHG | BODY MASS INDEX: 24.49 KG/M2 | HEIGHT: 63 IN

## 2025-04-22 DIAGNOSIS — R31.29 MICROSCOPIC HEMATURIA: Primary | ICD-10-CM

## 2025-04-22 DIAGNOSIS — R31.0 GROSS HEMATURIA: ICD-10-CM

## 2025-04-22 LAB
SL AMB  POCT GLUCOSE, UA: ABNORMAL
SL AMB LEUKOCYTE ESTERASE,UA: ABNORMAL
SL AMB POCT BILIRUBIN,UA: ABNORMAL
SL AMB POCT BLOOD,UA: ABNORMAL
SL AMB POCT CLARITY,UA: CLEAR
SL AMB POCT COLOR,UA: YELLOW
SL AMB POCT KETONES,UA: ABNORMAL
SL AMB POCT NITRITE,UA: ABNORMAL
SL AMB POCT PH,UA: 6
SL AMB POCT SPECIFIC GRAVITY,UA: 1.01
SL AMB POCT URINE PROTEIN: ABNORMAL
SL AMB POCT UROBILINOGEN: 0.2

## 2025-04-22 PROCEDURE — 52000 CYSTOURETHROSCOPY: CPT | Performed by: UROLOGY

## 2025-04-22 PROCEDURE — 81003 URINALYSIS AUTO W/O SCOPE: CPT | Performed by: UROLOGY

## 2025-04-22 NOTE — PROGRESS NOTES
"   Cystoscopy     Date/Time  4/22/2025 1:30 PM     Performed by  Junior Jessica MD   Authorized by  Junior Jessica MD     Universal Protocol:  procedure performed by consultantConsent: Verbal consent obtained. Written consent obtained.  Risks and benefits: risks, benefits and alternatives were discussed  Consent given by: patient  Time out: Immediately prior to procedure a \"time out\" was called to verify the correct patient, procedure, equipment, support staff and site/side marked as required.  Timeout called at: 4/22/2025 1:51 PM.  Patient understanding: patient states understanding of the procedure being performed  Patient consent: the patient's understanding of the procedure matches consent given  Procedure consent: procedure consent matches procedure scheduled  Relevant documents: relevant documents present and verified  Test results: test results available and properly labeled  Site marked: the operative site was not marked  Radiology Images displayed and confirmed. If images not available, report reviewed: imaging studies available  Required items: required blood products, implants, devices, and special equipment available  Patient identity confirmed: verbally with patient      Procedure Details:  Procedure type: cystoscopy    Patient tolerance: Patient tolerated the procedure well with no immediate complications    Additional Procedure Details: Office Cystoscopy Procedure Note    Indication:    Hematuria    Informed consent   The risks, benefits, complications, treatment options, and expected outcomes were discussed with the patient. The patient concurred with the proposed plan and provided informed consent.    Anesthesia  Lidocaine jelly 2%    Antibiotic prophylaxis   None    Procedure  In the presence of a female nurse, the patient was placed in the supine frog-legged position, was prepped and draped in the usual manner using sterile technique, and 2% lidocaine jelly instilled into the urethra. Prior " to this pelvic examination showed normal labia majora and minora, a normal vaginal introitus, normal quality vaginal mucosa,  A 17 F flexible cystoscope was then inserted into the urethra and the urethra and bladder carefully examined.  The following findings were noted:    Findings:  Urethra:  Normal  Bladder:  No papillary tumors.  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

## 2025-04-25 ENCOUNTER — OFFICE VISIT (OUTPATIENT)
Dept: FAMILY MEDICINE CLINIC | Facility: CLINIC | Age: 55
End: 2025-04-25
Payer: COMMERCIAL

## 2025-04-25 VITALS
TEMPERATURE: 97.6 F | WEIGHT: 140.2 LBS | OXYGEN SATURATION: 98 % | SYSTOLIC BLOOD PRESSURE: 120 MMHG | HEIGHT: 63 IN | DIASTOLIC BLOOD PRESSURE: 72 MMHG | HEART RATE: 79 BPM | BODY MASS INDEX: 24.84 KG/M2

## 2025-04-25 DIAGNOSIS — Z13.820 SCREENING FOR OSTEOPOROSIS: ICD-10-CM

## 2025-04-25 DIAGNOSIS — Z12.31 ENCOUNTER FOR SCREENING MAMMOGRAM FOR BREAST CANCER: ICD-10-CM

## 2025-04-25 DIAGNOSIS — Z78.0 POST-MENOPAUSAL: ICD-10-CM

## 2025-04-25 DIAGNOSIS — Z00.00 ANNUAL PHYSICAL EXAM: Primary | ICD-10-CM

## 2025-04-25 DIAGNOSIS — J30.89 ENVIRONMENTAL AND SEASONAL ALLERGIES: ICD-10-CM

## 2025-04-25 PROCEDURE — 99396 PREV VISIT EST AGE 40-64: CPT | Performed by: NURSE PRACTITIONER

## 2025-04-25 RX ORDER — CETIRIZINE HYDROCHLORIDE 5 MG/1
5 TABLET ORAL DAILY
COMMUNITY

## 2025-04-25 NOTE — PROGRESS NOTES
Adult Annual Physical  Name: Willow Vogt      : 1970      MRN: [id number]  Encounter Provider: LALITO Mcclain  Encounter Date: 2025   Encounter department: [facility]    :  Assessment & Plan  Annual physical exam         Post-menopausal    Orders:    DXA bone density spine hip and pelvis; Future    Screening for osteoporosis    Orders:    DXA bone density spine hip and pelvis; Future    Encounter for screening mammogram for breast cancer    Orders:    Mammo screening bilateral w 3d and cad; Future    Environmental and seasonal allergies    Orders:    Ambulatory Referral to Allergy; Future        Preventive Screenings:  - Diabetes Screening: screening up-to-date  - Cholesterol Screening: screening up-to-date   - Hepatitis C screening: screening up-to-date   - HIV screening: screening up-to-date   - Cervical cancer screening: screening not indicated   - Colon cancer screening: screening up-to-date   - Lung cancer screening: screening not indicated     Immunizations:  - Immunizations due: Influenza and Zoster (Shingrix)    Counseling/Anticipatory Guidance:  - Alcohol: discussed moderation in alcohol intake and recommendations for healthy alcohol use.   - Drug use: discussed harms of illicit drug use and how it can negatively impact mental/physical health.   - Tobacco use: discussed harms of tobacco use and management options for quitting.   - Dental health: discussed importance of regular tooth brushing, flossing, and dental visits.   - Sexual health: discussed sexually transmitted diseases, partner selection, use of condoms, avoidance of unintended pregnancy, and contraceptive alternatives.   - Diet: discussed recommendations for a healthy/well-balanced diet.   - Exercise: the importance of regular exercise/physical activity was discussed. Recommend exercise 3-5 times per week for at least 30 minutes.   - Injury prevention: discussed safety/seat belts, safety helmets,  smoke detectors, carbon monoxide detectors, and smoking near bedding or upholstery.     Immunizations reviewed: Tdap due in 2026; shingles due now (reviewed)-patient to consider: Denies COVID; does not flu vaccinated          History of Present Illness     Adult Annual Physical:  Patient presents for annual physical. Pleasant 54-year-old female presents today for an annual wellness exam  Age-appropriate preventative care/recommended screenings reviewed  Up-to-date with lab work-reviewed today  Currently following with urology-hematuria.     Diet and Physical Activity:  - Diet/Nutrition: no special diet. Encourage Mediterranean style diet  - Exercise: no formal exercise, walking and 3-4 times a week on average. Encourage increasing daily exercise-goal 20/30 minutes of cardiovascular and/or strengthening daily    General Health:  - Sleep: 7-8 hours of sleep on average. occasionally have trouble sleeping  - Hearing: normal hearing bilateral ears.  - Vision: vision problems, most recent eye exam > 1 year ago and wears glasses. wear glasses for reading, computer work  - Dental: regular dental visits, brushes teeth twice daily and floss regularly.    /GYN Health:  - Follows with GYN: yes.   - Menopause: postmenopausal.   - History of STDs: no  - Contraception: hysterectomy and menopause. 5 years out from endometrial cancer; establishing with routine GYN 6/3; overdue for mammogram-order placed; recommend DEXA; discussed vitamin supplementation: Recommend calcium 1200/vitamin D 2000 daily      Advanced Care Planning:  - Has an advanced directive?: no    - Has a durable medical POA?: no    - ACP document given to patient?: no      Review of Systems   Constitutional: Negative.    HENT: Negative.     Eyes: Negative.    Respiratory: Negative.     Cardiovascular: Negative.    Gastrointestinal: Negative.    Genitourinary: Negative.    Musculoskeletal: Negative.    Skin: Negative.    Neurological: Negative.   "  Psychiatric/Behavioral: Negative.           Objective   /72 (BP Location: Left arm, Patient Position: Sitting, Cuff Size: Adult)   Pulse 79   Temp 97.6 °F (36.4 °C) (Temporal)   Ht 5' 3\" (1.6 m)   Wt 63.6 kg (140 lb 3.2 oz)   LMP 12/13/2019 (Exact Date)   SpO2 98%   BMI 24.84 kg/m²     Physical Exam  Vitals and nursing note reviewed.   Constitutional:       General: She is not in acute distress.     Appearance: Normal appearance. She is well-developed and well-groomed. She is not ill-appearing.   HENT:      Head: Normocephalic.      Right Ear: Tympanic membrane, ear canal and external ear normal.      Left Ear: Tympanic membrane, ear canal and external ear normal.      Nose: Nose normal.      Mouth/Throat:      Mouth: Mucous membranes are moist.      Pharynx: Oropharynx is clear.   Eyes:      Conjunctiva/sclera: Conjunctivae normal.      Pupils: Pupils are equal, round, and reactive to light.   Neck:      Thyroid: No thyromegaly.      Vascular: No carotid bruit.   Cardiovascular:      Rate and Rhythm: Normal rate and regular rhythm.      Pulses:           Posterior tibial pulses are 2+ on the right side and 2+ on the left side.      Heart sounds: Normal heart sounds.   Pulmonary:      Effort: Pulmonary effort is normal. No respiratory distress.      Breath sounds: Normal breath sounds and air entry.   Abdominal:      General: Bowel sounds are normal.      Palpations: Abdomen is soft.      Tenderness: There is no abdominal tenderness.   Musculoskeletal:      Right lower leg: No edema.      Left lower leg: No edema.   Lymphadenopathy:      Cervical: No cervical adenopathy.   Skin:     General: Skin is warm and dry.   Neurological:      General: No focal deficit present.      Mental Status: She is alert and oriented to person, place, and time.   Psychiatric:         Attention and Perception: Attention normal.         Mood and Affect: Mood normal.         Behavior: Behavior normal.         Thought " Content: Thought content normal.         Judgment: Judgment normal.

## 2025-06-02 NOTE — PROGRESS NOTES
Name: Willow Vogt      : 1970      MRN: [id number]  Encounter Provider: LALITO Desir  Encounter Date: 6/3/2025   Encounter department: [facility]  :  Assessment & Plan  Well woman exam with routine gynecological exam  Encouraged healthy diet, exercise and lifestyle  Encouraged follow-up with PCP and specialists as needed  Encouraged supplementation with calcium, vitamin-D and strength training exercises for good bone health  Encouraged to schedule DXA scan and mammogram        Will call / [employer] message with results  VBI-   BMI Counseling: Body mass index is 25.01 kg/m². The BMI is above normal. Nutrition recommendations include 3-5 servings of fruits/vegetables daily. Exercise recommendations include exercising 3-5 times per week.    RTO 1 year for annual exam     History of Present Illness   HPI  Willow Vogt is a 55 y.o. female who presents for annual exam.  Last Pap smear 2019. S/p hysterectomy 2019 for endometrial cancer with Dr. Farah.  Last mammogram 23 birads 2.  Has mammogram ordered. CRC screening cologuard 24.  Has DXA scan ordered. The patient has no complaints today. The patient is sexually active.  The patient is not taking hormone replacement therapy. Patient denies post-menopausal vaginal bleeding.. The patient wears seatbelts: yes. The patient participates in regular exercise: yes. The patient reports that there is not domestic violence in her life.    History obtained from: patient    Review of Systems  Medical History Reviewed by provider this encounter:  Tobacco  Meds  Problems  Med Hx  Surg Hx  Fam Hx  Soc Hx    .     Objective   /80   Wt 64 kg (141 lb 3.2 oz)   LMP 2019 (Exact Date)   BMI 25.01 kg/m²      Physical Exam  General:   alert and oriented, in no acute distress   Heart: regular rate and rhythm, S1, S2 normal, no murmur, click, rub or gallop   Lungs: clear to auscultation bilaterally   Abdomen: soft,  non-tender, without masses or organomegaly   Vulva: normal, Bartholin's, Urethra, [place]'s normal   Vagina: normal mucosa, normal discharge, no palpable nodules, vaginal cuff intact   Cervix: surgically absent   Uterus: surgically absent, non-tender   Adnexa: surgically absent and no mass, fullness, tenderness   Breast:  breasts appear normal, no suspicious masses, no skin or nipple changes or axillary nodes.           Menstrual History:  OB History          0    Para   0    Term   0       0    AB   0    Living   0         SAB   0    IAB   0    Ectopic   0    Multiple   0    Live Births   0           Obstetric Comments   Menarche age 12               Menarche age: 12  Patient's last menstrual period was 2019 (exact date).

## 2025-06-03 ENCOUNTER — ANNUAL EXAM (OUTPATIENT)
Dept: OBGYN CLINIC | Facility: CLINIC | Age: 55
End: 2025-06-03
Payer: COMMERCIAL

## 2025-06-03 VITALS — DIASTOLIC BLOOD PRESSURE: 80 MMHG | WEIGHT: 141.2 LBS | SYSTOLIC BLOOD PRESSURE: 110 MMHG | BODY MASS INDEX: 25.01 KG/M2

## 2025-06-03 DIAGNOSIS — Z01.419 WELL WOMAN EXAM WITH ROUTINE GYNECOLOGICAL EXAM: Primary | ICD-10-CM

## 2025-06-03 PROBLEM — N94.10 DYSPAREUNIA IN FEMALE: Status: RESOLVED | Noted: 2021-02-17 | Resolved: 2025-06-03

## 2025-06-03 PROBLEM — Z90.710 STATUS POST TOTAL ABDOMINAL HYSTERECTOMY AND BILATERAL SALPINGO-OOPHORECTOMY (TAH-BSO): Status: RESOLVED | Noted: 2019-12-27 | Resolved: 2025-06-03

## 2025-06-03 PROBLEM — Z08 ENCOUNTER FOR FOLLOW-UP SURVEILLANCE OF ENDOMETRIAL CANCER: Status: RESOLVED | Noted: 2024-05-30 | Resolved: 2025-06-03

## 2025-06-03 PROBLEM — Z85.42 ENCOUNTER FOR FOLLOW-UP SURVEILLANCE OF ENDOMETRIAL CANCER: Status: RESOLVED | Noted: 2024-05-30 | Resolved: 2025-06-03

## 2025-06-03 PROBLEM — Z90.79 STATUS POST TOTAL ABDOMINAL HYSTERECTOMY AND BILATERAL SALPINGO-OOPHORECTOMY (TAH-BSO): Status: RESOLVED | Noted: 2019-12-27 | Resolved: 2025-06-03

## 2025-06-03 PROBLEM — Z90.722 STATUS POST TOTAL ABDOMINAL HYSTERECTOMY AND BILATERAL SALPINGO-OOPHORECTOMY (TAH-BSO): Status: RESOLVED | Noted: 2019-12-27 | Resolved: 2025-06-03

## 2025-06-03 PROCEDURE — S0612 ANNUAL GYNECOLOGICAL EXAMINA: HCPCS | Performed by: NURSE PRACTITIONER

## 2025-06-04 ENCOUNTER — HOSPITAL ENCOUNTER (OUTPATIENT)
Dept: CT IMAGING | Facility: HOSPITAL | Age: 55
Discharge: HOME | End: 2025-06-04
Attending: UROLOGY
Payer: COMMERCIAL

## 2025-06-04 DIAGNOSIS — R31.0 GROSS HEMATURIA: ICD-10-CM

## 2025-06-04 PROCEDURE — 74178 CT ABD&PLV WO CNTR FLWD CNTR: CPT

## 2025-06-04 RX ADMIN — IOHEXOL 85 ML: 350 INJECTION, SOLUTION INTRAVENOUS at 08:29

## 2025-06-04 RX ADMIN — IOHEXOL 100 ML: 350 INJECTION, SOLUTION INTRAVENOUS at 08:21

## 2025-06-10 ENCOUNTER — RESULTS FOLLOW-UP (OUTPATIENT)
Dept: UROLOGY | Facility: MEDICAL CENTER | Age: 55
End: 2025-06-10

## 2025-06-11 NOTE — TELEPHONE ENCOUNTER
----- Message from Junior Jessica MD sent at 6/10/2025  5:17 PM EDT -----  Normal kidneys and ureters on CT urogram   ----- Message -----  From: Interface, Radiology Results In  Sent: 6/10/2025   4:24 PM EDT  To: Junior Jessica MD

## 2025-06-20 ENCOUNTER — NURSE TRIAGE (OUTPATIENT)
Age: 55
End: 2025-06-20

## 2025-06-20 NOTE — TELEPHONE ENCOUNTER
"REASON FOR CONVERSATION: Urticaria    SYMPTOMS: Hives/rash to BL lower arms and hands.  Patient thinks that rash is from sun exposure but is not sure.  Denies any new medications/lotions/soaps etc.  Does not notice rash anywhere else on the body. Moderately itchy.    OTHER HEALTH INFORMATION: States that she has had rash like this in the past and she has referral to Allergy Specialist but has not made an appointment yet.    PROTOCOL DISPOSITION: Home Care    CARE ADVICE PROVIDED: Advised to continue using Benadryl as needed.  May try other antihistamine such as Zyrtec or Claritin.  Apply Hydrocortisone cream topically to rash for itching.  Use cool water/ice for itching. Instructed to go to the ER if any SOB, airway restriction occurs.  Instructed to call back if rash starts to spread or does not respond to OTC treatment.s    PRACTICE FOLLOW-UP: None needed at this time.    Reason for Disposition   Localized hives    Answer Assessment - Initial Assessment Questions  1. APPEARANCE: \"What does the rash look like?\"       Red, bumpy spots  2. LOCATION: \"Where is the rash located?\"       Lower arms BL and hands, left is worse than right  3. NUMBER: \"How many hives are there?\"       Multiple  4. SIZE: \"How big are the hives?\" (e.g., inches, cm, compare to coins) \"Do they all look the same or do they vary in shape and size?\"       Largest is quarter of an inch  5. ONSET: \"When did the hives begin?\" (e.g., hours or days ago)       Started yesterday after being out in the sun all day  6. ITCHING: \"Does it itch?\" If Yes, ask: \"How bad is the itch?\"  (e.g., none, mild, moderate, severe)      Yes, moderately itching  7. RECURRENT PROBLEM: \"Have you had hives before?\" If Yes, ask: \"When was the last time?\" and \"What happened that time?\"       Yes, has happened before, thinks it was related to sun exposure but not sure  8. TRIGGERS: \"Were you exposed to any new food, plant, cosmetic product or animal just before the hives " "began?\"      Denies  9. OTHER SYMPTOMS: \"Do you have any other symptoms?\" (e.g., fever, tongue swelling, difficulty breathing, abdomen pain)      Denies    Protocols used: Hives-Adult-[place]    "

## (undated) DEVICE — SUT STRATAFIX SPIRAL 3-0 PGA/PCL 30 X 30 CM SXMD2B408

## (undated) DEVICE — CHLORAPREP HI-LITE 26ML ORANGE

## (undated) DEVICE — PACK PBDS STERILE LAP LITHOTOMY RF

## (undated) DEVICE — SUT VICRYL 0 CT-1 36 IN J946H

## (undated) DEVICE — SUT VICRYL 0 REEL 54 IN J287G

## (undated) DEVICE — DRAPE FLUID WARMER (BIRD BATH)

## (undated) DEVICE — KIT, BETHLEHEM ROBOTIC PROST: Brand: CARDINAL HEALTH

## (undated) DEVICE — PREMIUM DRY TRAY LF: Brand: MEDLINE INDUSTRIES, INC.

## (undated) DEVICE — SUT MONOCRYL 4-0 PS-2 27 IN Y426H

## (undated) DEVICE — SUT PLAIN 2-0 CTX 27 IN 872H

## (undated) DEVICE — 40583 XL ADVANCED TRENDELENBURG POSITIONING KIT: Brand: 40583 XL ADVANCED TRENDELENBURG POSITIONING KIT

## (undated) DEVICE — SUT STRATAFIX SPIRAL 2-0 CT-1 20 CM SXPD1B400

## (undated) DEVICE — SYRINGE 10ML LL CONTROL TOP

## (undated) DEVICE — GLOVE INDICATOR PI UNDERGLOVE SZ 7 BLUE

## (undated) DEVICE — CANNULA SEAL

## (undated) DEVICE — BETHLEHEM UNIVERSAL LAPAROTOMY: Brand: CARDINAL HEALTH

## (undated) DEVICE — PENCIL ELECTROSURG E-Z CLEAN -0035H

## (undated) DEVICE — ADHESIVE SKIN HIGH VISCOSITY EXOFIN 1ML

## (undated) DEVICE — INTENDED FOR TISSUE SEPARATION, AND OTHER PROCEDURES THAT REQUIRE A SHARP SURGICAL BLADE TO PUNCTURE OR CUT.: Brand: BARD-PARKER SAFETY BLADES SIZE 10, STERILE

## (undated) DEVICE — UNDER BUTTOCKS DRAPE: Brand: CONVERTORS

## (undated) DEVICE — SUT VICRYL 3-0 SH 27 IN J416H

## (undated) DEVICE — NEEDLE COUNTER LG W/RULER

## (undated) DEVICE — TRAY FOLEY 16FR URIMETER SILICONE SURESTEP

## (undated) DEVICE — ELECTRODE BLADE MOD  E-Z CLEAN 6.5IN -0014M

## (undated) DEVICE — ANTIBACTERIAL UNDYED BRAIDED (POLYGLACTIN 910), SYNTHETIC ABSORBABLE SUTURE: Brand: COATED VICRYL

## (undated) DEVICE — MEDI-VAC YANK SUCT HNDL W/TPRD BULBOUS TIP: Brand: CARDINAL HEALTH

## (undated) DEVICE — LUBRICANT INST ELECTROLUBE ANTISTK WO PAD

## (undated) DEVICE — NEEDLE 25G X 1 1/2

## (undated) DEVICE — TIP COVER ACCESSORY

## (undated) DEVICE — TELFA NON-ADHERENT ABSORBENT DRESSING: Brand: TELFA

## (undated) DEVICE — TOWEL SET X-RAY

## (undated) DEVICE — MAYO STAND COVER: Brand: CONVERTORS

## (undated) DEVICE — INVIEW CLEAR LEGGINGS: Brand: CONVERTORS

## (undated) DEVICE — [HIGH FLOW INSUFFLATOR,  DO NOT USE IF PACKAGE IS DAMAGED,  KEEP DRY,  KEEP AWAY FROM SUNLIGHT,  PROTECT FROM HEAT AND RADIOACTIVE SOURCES.]: Brand: PNEUMOSURE

## (undated) DEVICE — GAUZE SPONGES,16 PLY: Brand: CURITY

## (undated) DEVICE — COLUMN DRAPE

## (undated) DEVICE — ABDOMINAL PAD: Brand: DERMACEA

## (undated) DEVICE — GLOVE PI ULTRA TOUCH SZ.7.5

## (undated) DEVICE — ARM DRAPE

## (undated) DEVICE — CAUTERY TIP POLISHER: Brand: DEVON

## (undated) DEVICE — Device

## (undated) DEVICE — SUT PDS II 1 TP-1 96 IN Z880G

## (undated) DEVICE — MEDI-VAC YANKAUER SUCTION HANDLE W/BULBOUS AND CONTROL VENT: Brand: CARDINAL HEALTH

## (undated) DEVICE — INTENDED FOR TISSUE SEPARATION, AND OTHER PROCEDURES THAT REQUIRE A SHARP SURGICAL BLADE TO PUNCTURE OR CUT.: Brand: BARD-PARKER SAFETY BLADES SIZE 11, STERILE

## (undated) DEVICE — GLOVE INDICATOR PI UNDERGLOVE SZ 7.5 BLUE

## (undated) DEVICE — EXIDINE 4 PCT

## (undated) DEVICE — DRAPE EQUIPMENT RF WAND

## (undated) DEVICE — DRAPE LAPAROTOMY W/POUCHES

## (undated) DEVICE — SPONGE SCRUB 4 PCT CHLORHEXIDINE

## (undated) DEVICE — ASTOUND STANDARD SURGICAL GOWN, XL: Brand: CONVERTORS

## (undated) DEVICE — SUT STRATAFIX SPIRAL 4-0 PGA/PCL 30 X 30 CM SXMD2B409

## (undated) DEVICE — INTENDED FOR TISSUE SEPARATION, AND OTHER PROCEDURES THAT REQUIRE A SHARP SURGICAL BLADE TO PUNCTURE OR CUT.: Brand: BARD-PARKER SAFETY BLADES SIZE 15, STERILE

## (undated) DEVICE — INSUFFLATION NEEDLE TO ESTABLISH PNEUMOPERITONEUM.: Brand: INSUFFLATION NEEDLE

## (undated) DEVICE — GLOVE PI ULTRA TOUCH SZ.7.0

## (undated) DEVICE — 3000CC GUARDIAN II: Brand: GUARDIAN

## (undated) DEVICE — ENSEAL 20 CM SHAFT, LARGE JAW: Brand: ENSEAL X1